# Patient Record
Sex: FEMALE | Race: BLACK OR AFRICAN AMERICAN | Employment: FULL TIME | URBAN - METROPOLITAN AREA
[De-identification: names, ages, dates, MRNs, and addresses within clinical notes are randomized per-mention and may not be internally consistent; named-entity substitution may affect disease eponyms.]

---

## 2019-04-26 ENCOUNTER — OFFICE VISIT - HEALTHEAST (OUTPATIENT)
Dept: FAMILY MEDICINE | Facility: CLINIC | Age: 29
End: 2019-04-26

## 2019-04-26 DIAGNOSIS — N64.4 BREAST PAIN, LEFT: ICD-10-CM

## 2019-04-26 ASSESSMENT — MIFFLIN-ST. JEOR: SCORE: 1559.36

## 2019-04-30 ENCOUNTER — RECORDS - HEALTHEAST (OUTPATIENT)
Dept: ADMINISTRATIVE | Facility: OTHER | Age: 29
End: 2019-04-30

## 2019-11-27 ENCOUNTER — OFFICE VISIT - HEALTHEAST (OUTPATIENT)
Dept: FAMILY MEDICINE | Facility: CLINIC | Age: 29
End: 2019-11-27

## 2019-11-27 DIAGNOSIS — L29.9 ITCHING: ICD-10-CM

## 2019-11-27 LAB
ALBUMIN SERPL-MCNC: 3.7 G/DL (ref 3.5–5)
ALP SERPL-CCNC: 47 U/L (ref 45–120)
ALT SERPL W P-5'-P-CCNC: 12 U/L (ref 0–45)
ANION GAP SERPL CALCULATED.3IONS-SCNC: 5 MMOL/L (ref 5–18)
AST SERPL W P-5'-P-CCNC: 12 U/L (ref 0–40)
BASOPHILS # BLD AUTO: 0 THOU/UL (ref 0–0.2)
BASOPHILS NFR BLD AUTO: 1 % (ref 0–2)
BILIRUB SERPL-MCNC: 0.2 MG/DL (ref 0–1)
BUN SERPL-MCNC: 12 MG/DL (ref 8–22)
CALCIUM SERPL-MCNC: 9.1 MG/DL (ref 8.5–10.5)
CHLORIDE BLD-SCNC: 107 MMOL/L (ref 98–107)
CO2 SERPL-SCNC: 25 MMOL/L (ref 22–31)
CREAT SERPL-MCNC: 0.86 MG/DL (ref 0.6–1.1)
EOSINOPHIL # BLD AUTO: 0.1 THOU/UL (ref 0–0.4)
EOSINOPHIL NFR BLD AUTO: 2 % (ref 0–6)
ERYTHROCYTE [DISTWIDTH] IN BLOOD BY AUTOMATED COUNT: 12.7 % (ref 11–14.5)
GFR SERPL CREATININE-BSD FRML MDRD: >60 ML/MIN/1.73M2
GLUCOSE BLD-MCNC: 89 MG/DL (ref 70–125)
HCT VFR BLD AUTO: 34.9 % (ref 35–47)
HGB BLD-MCNC: 11.5 G/DL (ref 12–16)
LYMPHOCYTES # BLD AUTO: 2.1 THOU/UL (ref 0.8–4.4)
LYMPHOCYTES NFR BLD AUTO: 39 % (ref 20–40)
MCH RBC QN AUTO: 28.5 PG (ref 27–34)
MCHC RBC AUTO-ENTMCNC: 33.1 G/DL (ref 32–36)
MCV RBC AUTO: 86 FL (ref 80–100)
MONOCYTES # BLD AUTO: 0.5 THOU/UL (ref 0–0.9)
MONOCYTES NFR BLD AUTO: 9 % (ref 2–10)
NEUTROPHILS # BLD AUTO: 2.6 THOU/UL (ref 2–7.7)
NEUTROPHILS NFR BLD AUTO: 49 % (ref 50–70)
PLATELET # BLD AUTO: 198 THOU/UL (ref 140–440)
PMV BLD AUTO: 8.6 FL (ref 7–10)
POTASSIUM BLD-SCNC: 4.1 MMOL/L (ref 3.5–5)
PROT SERPL-MCNC: 7.5 G/DL (ref 6–8)
RBC # BLD AUTO: 4.06 MILL/UL (ref 3.8–5.4)
SODIUM SERPL-SCNC: 137 MMOL/L (ref 136–145)
TSH SERPL DL<=0.005 MIU/L-ACNC: 0.92 UIU/ML (ref 0.3–5)
WBC: 5.3 THOU/UL (ref 4–11)

## 2019-11-29 ENCOUNTER — COMMUNICATION - HEALTHEAST (OUTPATIENT)
Dept: INTERNAL MEDICINE | Facility: CLINIC | Age: 29
End: 2019-11-29

## 2019-12-01 ENCOUNTER — COMMUNICATION - HEALTHEAST (OUTPATIENT)
Dept: FAMILY MEDICINE | Facility: CLINIC | Age: 29
End: 2019-12-01

## 2019-12-26 ENCOUNTER — OFFICE VISIT - HEALTHEAST (OUTPATIENT)
Dept: FAMILY MEDICINE | Facility: CLINIC | Age: 29
End: 2019-12-26

## 2019-12-26 DIAGNOSIS — R21 RASH: ICD-10-CM

## 2019-12-26 DIAGNOSIS — Z00.00 ROUTINE GENERAL MEDICAL EXAMINATION AT A HEALTH CARE FACILITY: ICD-10-CM

## 2019-12-26 LAB
ANION GAP SERPL CALCULATED.3IONS-SCNC: 8 MMOL/L (ref 5–18)
BUN SERPL-MCNC: 11 MG/DL (ref 8–22)
CALCIUM SERPL-MCNC: 9.2 MG/DL (ref 8.5–10.5)
CHLORIDE BLD-SCNC: 109 MMOL/L (ref 98–107)
CHOLEST SERPL-MCNC: 171 MG/DL
CO2 SERPL-SCNC: 24 MMOL/L (ref 22–31)
CREAT SERPL-MCNC: 0.75 MG/DL (ref 0.6–1.1)
FASTING STATUS PATIENT QL REPORTED: YES
GFR SERPL CREATININE-BSD FRML MDRD: >60 ML/MIN/1.73M2
GLUCOSE BLD-MCNC: 88 MG/DL (ref 70–125)
HDLC SERPL-MCNC: 46 MG/DL
LDLC SERPL CALC-MCNC: 118 MG/DL
POTASSIUM BLD-SCNC: 4 MMOL/L (ref 3.5–5)
SODIUM SERPL-SCNC: 141 MMOL/L (ref 136–145)
TRIGL SERPL-MCNC: 36 MG/DL

## 2019-12-26 ASSESSMENT — MIFFLIN-ST. JEOR: SCORE: 1558.68

## 2019-12-27 ENCOUNTER — COMMUNICATION - HEALTHEAST (OUTPATIENT)
Dept: FAMILY MEDICINE | Facility: CLINIC | Age: 29
End: 2019-12-27

## 2019-12-27 LAB
BKR LAB AP ABNORMAL BLEEDING: NO
BKR LAB AP BIRTH CONTROL/HORMONES: NORMAL
BKR LAB AP CERVICAL APPEARANCE: NORMAL
BKR LAB AP GYN ADEQUACY: NORMAL
BKR LAB AP GYN INTERPRETATION: NORMAL
BKR LAB AP HPV REFLEX: NORMAL
BKR LAB AP LMP: NORMAL
BKR LAB AP PATIENT STATUS: NORMAL
BKR LAB AP PREVIOUS ABNORMAL: NORMAL
BKR LAB AP PREVIOUS NORMAL: NORMAL
C TRACH DNA SPEC QL PROBE+SIG AMP: NEGATIVE
HIGH RISK?: NO
HSV1 IGG SERPL QL IA: POSITIVE
HSV2 IGG SERPL QL IA: POSITIVE
PATH REPORT.COMMENTS IMP SPEC: NORMAL
RESULT FLAG (HE HISTORICAL CONVERSION): NORMAL

## 2019-12-30 ENCOUNTER — COMMUNICATION - HEALTHEAST (OUTPATIENT)
Dept: SCHEDULING | Facility: CLINIC | Age: 29
End: 2019-12-30

## 2019-12-30 ENCOUNTER — COMMUNICATION - HEALTHEAST (OUTPATIENT)
Dept: FAMILY MEDICINE | Facility: CLINIC | Age: 29
End: 2019-12-30

## 2020-01-13 ENCOUNTER — OFFICE VISIT - HEALTHEAST (OUTPATIENT)
Dept: FAMILY MEDICINE | Facility: CLINIC | Age: 30
End: 2020-01-13

## 2020-01-13 DIAGNOSIS — R07.89 LEFT-SIDED CHEST WALL PAIN: ICD-10-CM

## 2020-01-13 DIAGNOSIS — B00.9 HSV (HERPES SIMPLEX VIRUS) INFECTION: ICD-10-CM

## 2020-01-13 DIAGNOSIS — M79.661 PAIN OF RIGHT LOWER LEG: ICD-10-CM

## 2020-01-16 ENCOUNTER — COMMUNICATION - HEALTHEAST (OUTPATIENT)
Dept: FAMILY MEDICINE | Facility: CLINIC | Age: 30
End: 2020-01-16

## 2020-02-05 ENCOUNTER — COMMUNICATION - HEALTHEAST (OUTPATIENT)
Dept: FAMILY MEDICINE | Facility: CLINIC | Age: 30
End: 2020-02-05

## 2021-03-29 ENCOUNTER — OFFICE VISIT - HEALTHEAST (OUTPATIENT)
Dept: FAMILY MEDICINE | Facility: CLINIC | Age: 31
End: 2021-03-29

## 2021-03-29 DIAGNOSIS — R09.A2 GLOBUS PHARYNGEUS: ICD-10-CM

## 2021-03-29 DIAGNOSIS — B00.9 HSV (HERPES SIMPLEX VIRUS) INFECTION: ICD-10-CM

## 2021-03-29 DIAGNOSIS — Z30.09 FAMILY PLANNING: ICD-10-CM

## 2021-03-29 RX ORDER — ERGOCALCIFEROL 1.25 MG/1
50000 CAPSULE ORAL
Status: SHIPPED | COMMUNITY
Start: 2021-03-29 | End: 2023-02-16

## 2021-03-29 RX ORDER — FERROUS GLUCONATE 324(38)MG
324 TABLET ORAL
Status: SHIPPED | COMMUNITY
Start: 2021-03-29 | End: 2022-11-08

## 2021-03-29 RX ORDER — PRENATAL VIT/IRON FUM/FOLIC AC 27MG-0.8MG
1 TABLET ORAL DAILY
Qty: 90 TABLET | Refills: 3 | Status: SHIPPED | OUTPATIENT
Start: 2021-03-29 | End: 2022-05-26

## 2021-03-29 RX ORDER — OMEPRAZOLE 40 MG/1
40 CAPSULE, DELAYED RELEASE ORAL DAILY
Qty: 90 CAPSULE | Refills: 0 | Status: SHIPPED | OUTPATIENT
Start: 2021-03-29 | End: 2022-11-08

## 2021-05-28 NOTE — PROGRESS NOTES
"Assessment/Plan:        1. Breast pain, left  After verbal consent to examining her breast, she was dressed into a gown, and both breasts were examined with no obvious abnormalities.     Plan:     - US Breast Limited (Focal) Left; Future  To rule out structural abnormalities   We will follow-up to the results of the study and manage accordingly.      Patient advised of trying   - Ibuprofen up to 800 mg three times a day prn   - Evening Primrose Oil 0657-3646 mg orally daily  - Chaste Tree Fowler 30 to 40 mg orally daily    30 minutes or greater were spent with the patient today with greater than 50% of the times spent in counseling and management of care.       Subjective:    Patient ID:   Muriel Chauhan is a 28 y.o. female presenting with left breast pain for the past 2 months, starting up with just an intermittent sharp pain but for the past week has become more frequent.  She has no pain at this time no lumps or bumps or discharge of being seen.  She has no prior breast problems there is no family history of breast disease or cancer.      Review of Systems  Allergy: reviewed  General : negative  A complete 5 point review of systems was obtained and is negative other than what is stated in the HPI.      The following patient's history were reviewed and updated as appropriate:   She  has no past medical history on file.  Her family history is not on file.  She  reports that she has never smoked. She has never used smokeless tobacco. Her alcohol and drug histories are not on file..      No outpatient encounter medications on file as of 4/26/2019.     No facility-administered encounter medications on file as of 4/26/2019.          Objective:   /72 (Patient Site: Right Arm, Patient Position: Sitting, Cuff Size: Adult Regular)   Pulse 77   Ht 5' 3.5\" (1.613 m)   Wt 189 lb (85.7 kg)   SpO2 95%   BMI 32.95 kg/m         Physical Exam  General: NAD and well hydrated   Breasts: breasts appear normal, no " suspicious masses, no skin or nipple changes or axillary nodes.     Zulay Boyer CMA present during the exam

## 2021-05-28 NOTE — PATIENT INSTRUCTIONS - HE
1. Breast pain, left  - US Breast Limited (Focal) Left; Future    You may also try   - Ibuprofen up to 800 mg three times a day prn   - Evening Primrose Oil 1402-7254 mg orally daily  - Chaste Tree Fowler 30 to 40 mg orally daily  We will follow-up to the results of the study and manage accordingly.

## 2021-06-03 VITALS — BODY MASS INDEX: 32.27 KG/M2 | HEIGHT: 64 IN | WEIGHT: 189 LBS

## 2021-06-03 NOTE — PROGRESS NOTES
Assessment/Plan:        1. Itching  Exam findings were discussed   Consider dry skin as the culprit.     Will check the following lab :   - Comprehensive Metabolic Panel  - HM1(CBC and Differential)  - Thyroid Stimulating Hormone (TSH)  - HM1 (CBC with Diff)    Plan:   - predniSONE (DELTASONE) 20 MG tablet; Take 20 mg by mouth daily for 7 days.  Dispense: 7 tablet; Refill: 0     Close follow up if no significant change or improvement as anticipated.       At the conclusion of the encounter the plan of care, disposition and all questions were answered and reviewed, and the patient acknowledged understanding and was involved in the decision making regarding the overall care plan.           Subjective:    Patient ID:   Tien Sanchez is a 28 y.o. female presenting with having recurrent itchy rash noted around her waist up for the past month. She states not taking hot showers in general, but has had a brief change of soap recently and has since changed back to original.     She denies any other life style changes and the OTC eucerin cream hasn't all that been effective.     She Denies taking supplements, or other prescribed meds.   Diet: no change.    Review of Systems  Allergy: reviewed  General : negative  A complete 5 point review of systems was obtained and is negative other than what is stated in the HPI.      The following patient's history were reviewed and updated as appropriate:   She  has no past medical history on file..      No outpatient encounter medications on file as of 11/27/2019.     No facility-administered encounter medications on file as of 11/27/2019.          Objective:   /64 (Patient Site: Right Arm, Patient Position: Sitting, Cuff Size: Adult Large)   Pulse 60   Wt 191 lb (86.6 kg)   SpO2 98%   BMI 33.30 kg/m        Physical Exam  Gen: NAD, well hydrated  Skin: dry skin , no other vesicles, exfoliations, or petechiae.    [place]

## 2021-06-04 VITALS
WEIGHT: 187.1 LBS | BODY MASS INDEX: 31.94 KG/M2 | DIASTOLIC BLOOD PRESSURE: 68 MMHG | HEART RATE: 82 BPM | OXYGEN SATURATION: 99 % | SYSTOLIC BLOOD PRESSURE: 114 MMHG | HEIGHT: 64 IN

## 2021-06-04 VITALS
WEIGHT: 191 LBS | BODY MASS INDEX: 33.3 KG/M2 | HEART RATE: 60 BPM | DIASTOLIC BLOOD PRESSURE: 64 MMHG | SYSTOLIC BLOOD PRESSURE: 114 MMHG | OXYGEN SATURATION: 98 %

## 2021-06-04 VITALS
OXYGEN SATURATION: 98 % | WEIGHT: 188.8 LBS | SYSTOLIC BLOOD PRESSURE: 102 MMHG | DIASTOLIC BLOOD PRESSURE: 60 MMHG | HEART RATE: 65 BPM | BODY MASS INDEX: 32.41 KG/M2

## 2021-06-04 NOTE — PROGRESS NOTES
FEMALE PREVENTATIVE EXAM    Assessment and Plan:       1. Routine general medical examination at a health care facility  - Basic Metabolic Panel  - Lipid Profile  - Gynecologic Cytology (PAP Smear)  - Chlamydia trachomatis, AMP Probe    2. Rash  Noted intermittently in the genitals and medial thighs.   Normal exam today     Plan:   - Herpes simplex Virus (Type 1 and 2) IgG Antibody     Next follow up:  Return in about 1 year (around 12/26/2020) for or sooner with any issues or concerns.    Immunization Review  Adult Imm Review: No immunizations due today    I discussed the following with the patient:   Adult Healthy Living: Importance of regular exercise  Healthy nutrition        Subjective:   Chief Complaint: Muriel Chauhan is an 29 y.o. female here for a preventative health visit.     Other concerns:   Noting to breaking out with a burning / blistering rash in her genitals and upper medial thighs intermittently.  She currently has no rash.     Healthy Habits  Are you taking a daily aspirin? No  Do you typically exercising at least 40 min, 3-4 times per week?  NO  Do you usually eat at least 4 servings of fruit and vegetables a day, include whole grains and fiber and avoid regularly eating high fat foods? Yes  Have you had an eye exam in the past two years? NO  Do you see a dentist twice per year? NO  Do you have any concerns regarding sleep? YES    Safety Screen  If you own firearms, are they secured in a locked gun cabinet or with trigger locks? The patient does not own any firearms  Do you feel you are safe where you are living?: Yes (12/8/2019 12:45 AM)  Do you feel you are safe in your relationship(s)?: Yes (12/8/2019 12:45 AM)      Review of Systems:  Please see above.  The rest of the review of systems are negative for all systems.     Pap History:   Last 3 PAP results:  No results found for: PAP  Cancer Screening       Status Date      PAP SMEAR Next Due 12/26/2022      Done 12/26/2019 GYNECOLOGIC  "CYTOLOGY (PAP SMEAR)          Patient Care Team:  Provider, No Primary Care as PCP - General  Hima Mcdaniels MD as Assigned PCP        History     Reviewed By Date/Time Sections Reviewed    Hima Mcdaniels MD 12/26/2019 11:22 AM Tobacco, Alcohol, Drug Use, Sexual Activity, Family    Hima Mcdaniels MD 12/26/2019 11:21 AM Medical    Remi Ngo, [facility] 12/26/2019 11:00 AM Tobacco    Remi Ngo, [facility] 12/26/2019 10:58 AM Tobacco            Objective:   Vital Signs:   Visit Vitals  /68   Pulse 82   Ht 5' 4\" (1.626 m)   Wt 187 lb 1.6 oz (84.9 kg)   LMP 12/26/2019   SpO2 99%   BMI 32.12 kg/m           PHYSICAL EXAM  General : appears stated age, no distress and well hydrated   Head: Normocephalic, without obvious abnormality, atraumatic  Eyes: conjunctivae/corneas clear. PERRL, EOM's intact. Fundi benign.  ENT: normal TM and canals bilaterally, no rhinorrhea, nl pharynx, palate, tongue, oral mucosa and gums.   Neck: no adenopathy, no carotid bruit, no JVD, supple, symmetrical, trachea midline and thyroid not enlarged, symmetric, no tenderness/mass/nodules  Back: symmetric, no curvature. ROM normal. No CVA tenderness.  Lungs: clear to auscultation bilaterally.   Heart: regular rate and rhythm, S1, S2 normal, no murmur, click, rub or gallop  Abdomen: soft, non-tender; bowel sounds normal; no masses,  no organomegaly  Pelvic: cervix normal in appearance, external genitalia normal, no adnexal masses or tenderness, no cervical motion tenderness, rectovaginal septum normal, uterus normal size, shape, and consistency and vagina normal without discharge  Extremities: extremities normal, atraumatic, no cyanosis or edema  Pulses: 2+ and symmetric  Skin: Skin color, texture, turgor normal. No rashes or lesions  Neurologic: Grossly intact,  normal strength and tone. Normal symmetric reflexes. Normal coordination and gait  Psych: Alert and oriented X3, normal mood and affect.              Additional " Screenings Completed Today:

## 2021-06-04 NOTE — TELEPHONE ENCOUNTER
RN Telephone note for CMT line:      Pt would like to speak with PCP or covering MD regarding test results; positive results for some and low values for others.    Has questions.    Positive for HSV 1 and HSV 2    Rash on thigh present.    Pt doesn't have Rx for antiviral medication.    PLAN:  Will forward to covering MD per patient request.    Giuliana Bermeo RN   [facility] RN Triage

## 2021-06-04 NOTE — TELEPHONE ENCOUNTER
----- Message from Hima Mcdaniels MD sent at 12/27/2019  2:57 PM CST -----  Please call patient :   Positive with  Both HSV-1 and HSV-2 are implicated in genital and orofacial primary infections.   The rash experienced is likely caused by the HSV infections    Managed with antiviral medication per out breaks, or daily prophylaxis for frequent outbreaks.

## 2021-06-05 VITALS
TEMPERATURE: 98.1 F | WEIGHT: 201 LBS | HEART RATE: 86 BPM | DIASTOLIC BLOOD PRESSURE: 76 MMHG | OXYGEN SATURATION: 97 % | SYSTOLIC BLOOD PRESSURE: 124 MMHG | BODY MASS INDEX: 34.5 KG/M2

## 2021-06-05 NOTE — TELEPHONE ENCOUNTER
Pt has [facility] visit 2/6/20 for immunization.  Pt last seen 1/13/20    First Hepatitis B immunization given 10/11/19.    Please review order and sign.

## 2021-06-05 NOTE — PROGRESS NOTES
Assessment/Plan:        1. HSV (herpes simplex virus) infection  Pathophysiology, mode of transmission reviewed with the patient  Treatment options reviewed.  She plans to take antiviral as needed per  Breakouts  - valACYclovir (VALTREX) 500 MG tablet; Take 1 tablet (500 mg total) by mouth 2 (two) times a day for 3 days.  Dispense: 6 tablet; Refill: 5    2. Left-sided chest wall pain  Exam findings were discussed and consider musculoskeletal  Supportive care with NSAIDs  Close follow up if no significant change or improvement as anticipated.      3. Pain of right lower leg  Exam findings were discussed  - US Leg Non Vascular Right;   We will follow-up to the results of the study and manage accordingly.        At the conclusion of the encounter the plan of care, disposition and all questions were answered and reviewed, and the patient acknowledged understanding and was involved in the decision making regarding the overall care plan.     Patient Care Team:  Provider, No Primary Care as PCP - Hima Eli MD as Assigned PCP      30 minutes or greater were spent with the patient today with greater than 50% of the times spent in counseling and management of care.     Subjective:    Patient ID:   Muriel Chauhan is a 29 y.o. female comes in follow-up of HSV and having the following concerns:    1. HSV (herpes simplex virus) infection  She is inquiring about treatment options and would like to know more about how it is being infectious and transmitted.    2. Left-sided chest wall pain  She has been having intermittent pain in the left side of the chest radiating into her shoulder and arm worse with movement and moving her arm.  She believes that it is musculoskeletal but wanting to check.  She had a neg/ normal Breast US back in April of 2019.        3. Pain of right lower leg  She has been experiencing pain in her right lower extremity for the past several days with no known injury or trauma recalled.             Review of Systems  Allergy: reviewed  General : negative  A complete 5 point review of systems was obtained and is negative other than what is stated in the HPI.     The following patient's history were reviewed and updated as appropriate:   She  has no past medical history on file..      No outpatient encounter medications on file as of 1/13/2020.     No facility-administered encounter medications on file as of 1/13/2020.          Objective:   /60 (Patient Site: Right Arm, Patient Position: Sitting, Cuff Size: Adult Regular)   Pulse 65   Wt 188 lb 12.8 oz (85.6 kg)   LMP 12/26/2019   SpO2 98%   BMI 32.41 kg/m        Physical Exam  General exam: No apparent distress and well-hydrated  Chest and CV exam: Palpable tenderness to the left chest wall, clear to auscultation bilaterally with no overlying chest bruising or deformity, regular rate rhythm S1-S2 no murmur  Skin: No rash  Extremities: Right lower leg exam normal to inspection, palpable tenderness to the lateral medial aspect of the lower leg with no swelling

## 2021-06-05 NOTE — TELEPHONE ENCOUNTER
----- Message from Hima Mcdaniels MD sent at 1/15/2020  4:01 PM CST -----US results   Please call patient :   Radiograph ( xray ) is recommended for further eval

## 2021-06-16 NOTE — PROGRESS NOTES
Assessment & Plan     Globus pharyngeus  Exam findings were discussed  Plan:  - omeprazole (PRILOSEC) 40 MG capsule; Take 1 capsule (40 mg total) by mouth daily.    Consider referral to ENT if not better as anticipated    Family planning  - prenatal vit no.130-iron-folic (PRENATAL VITAMIN) 27 mg iron- 800 mcg Tab tablet; Take 1 tablet by mouth daily.    HSV (herpes simplex virus) infection  Refill  - valACYclovir (VALTREX) 500 MG tablet; Take 1 tablet (500 mg total) by mouth 2 (two) times a day for 3 days.    30 minutes spent on the date of the encounter doing chart review, patient visit and documentation        No follow-ups on file.    Hima Mcdaniels MD  [facility]    Rosita Chauhan is 30 y.o. and presents today for the following health issues   Sensation of sorethroat or mucus building up in the back of the throat on and off for the past year, seem to worsen when eating or drinking anything cold, after spicy foods.   She has a history of follow-up GERD but has not been taking any medicine for the past.  She also needs her Valtrex refilled.             Objective    /76 (Patient Site: Right Arm, Patient Position: Sitting, Cuff Size: Adult Regular)   Pulse 86   Temp 98.1  F (36.7  C) (Oral)   Wt 201 lb (91.2 kg)   SpO2 97%   BMI 34.50 kg/m    Body mass index is 34.5 kg/m .  Physical Exam  General Appearance:    Alert, cooperative, no distress   Throat:   mucous membranes moist, pharynx normal without lesions and tongue normal   Neck:   Supple, symmetrical, trachea midline, no adenopathy;     thyroid:  no enlargement/tenderness/nodules;    Lungs:     clear to auscultation, no wheezes, rales or rhonchi, symmetric air entry     Heart:    Regular rate and rhythm, S1 and S2 normal, no murmur, rub   or gallop   Skin:    no rashes or lesions

## 2021-06-19 NOTE — LETTER
Letter by Hima Mcdaniels MD at      Author: Hima Mcdaniels MD Service: -- Author Type: --    Filed:  Encounter Date: 11/29/2019 Status: Signed         Muriel Chauhan  [address] [place] [address]             November 29, 2019         Dear Ms. Chauhan,    Below are the results from your recent visit:    Resulted Orders   Comprehensive Metabolic Panel   Result Value Ref Range    Sodium 137 136 - 145 mmol/L    Potassium 4.1 3.5 - 5.0 mmol/L    Chloride 107 98 - 107 mmol/L    CO2 25 22 - 31 mmol/L    Anion Gap, Calculation 5 5 - 18 mmol/L    Glucose 89 70 - 125 mg/dL    BUN 12 8 - 22 mg/dL    Creatinine 0.86 0.60 - 1.10 mg/dL    GFR MDRD Af Amer >60 >60 mL/min/1.73m2    GFR MDRD Non Af Amer >60 >60 mL/min/1.73m2    Bilirubin, Total 0.2 0.0 - 1.0 mg/dL    Calcium 9.1 8.5 - 10.5 mg/dL    Protein, Total 7.5 6.0 - 8.0 g/dL    Albumin 3.7 3.5 - 5.0 g/dL    Alkaline Phosphatase 47 45 - 120 U/L    AST 12 0 - 40 U/L    ALT 12 0 - 45 U/L    Narrative    Fasting Glucose reference range is 70-99 mg/dL per  American Diabetes Association (ADA) guidelines.   Thyroid Stimulating Hormone (TSH)   Result Value Ref Range    TSH 0.92 0.30 - 5.00 uIU/mL   HM1 (CBC with Diff)   Result Value Ref Range    WBC 5.3 4.0 - 11.0 thou/uL    RBC 4.06 3.80 - 5.40 mill/uL    Hemoglobin 11.5 (L) 12.0 - 16.0 g/dL    Hematocrit 34.9 (L) 35.0 - 47.0 %    MCV 86 80 - 100 fL    MCH 28.5 27.0 - 34.0 pg    MCHC 33.1 32.0 - 36.0 g/dL    RDW 12.7 11.0 - 14.5 %    Platelets 198 140 - 440 thou/uL    MPV 8.6 7.0 - 10.0 fL    Neutrophils % 49 (L) 50 - 70 %    Lymphocytes % 39 20 - 40 %    Monocytes % 9 2 - 10 %    Eosinophils % 2 0 - 6 %    Basophils % 1 0 - 2 %    Neutrophils Absolute 2.6 2.0 - 7.7 thou/uL    Lymphocytes Absolute 2.1 0.8 - 4.4 thou/uL    Monocytes Absolute 0.5 0.0 - 0.9 thou/uL    Eosinophils Absolute 0.1 0.0 - 0.4 thou/uL    Basophils Absolute 0.0 0.0 - 0.2 thou/uL              Low hgb, otherwise normal labs      Please  call with questions or contact us using [employer].    Sincerely,        Electronically signed by Hima Mcdaniels MD

## 2021-06-20 ENCOUNTER — HEALTH MAINTENANCE LETTER (OUTPATIENT)
Age: 31
End: 2021-06-20

## 2021-06-20 NOTE — LETTER
Letter by Hima Mcdaniels MD at      Author: Hima Mcdaniels MD Service: -- Author Type: --    Filed:  Encounter Date: 12/27/2019 Status: Signed         Muriel Chauhan  [address] [place] [address]             December 27, 2019         Dear Ms. Chauhan,    Below are the results from your recent visit:    Resulted Orders   Basic Metabolic Panel   Result Value Ref Range    Sodium 141 136 - 145 mmol/L    Potassium 4.0 3.5 - 5.0 mmol/L    Chloride 109 (H) 98 - 107 mmol/L    CO2 24 22 - 31 mmol/L    Anion Gap, Calculation 8 5 - 18 mmol/L    Glucose 88 70 - 125 mg/dL    Calcium 9.2 8.5 - 10.5 mg/dL    BUN 11 8 - 22 mg/dL    Creatinine 0.75 0.60 - 1.10 mg/dL    GFR MDRD Af Amer >60 >60 mL/min/1.73m2    GFR MDRD Non Af Amer >60 >60 mL/min/1.73m2    Narrative    Fasting Glucose reference range is 70-99 mg/dL per  American Diabetes Association (ADA) guidelines.   Lipid Profile   Result Value Ref Range    Triglycerides 36 <=149 mg/dL    Cholesterol 171 <=199 mg/dL    LDL Calculated 118 <=129 mg/dL    HDL Cholesterol 46 (L) >=50 mg/dL    Patient Fasting > 8hrs? Yes    Herpes simplex Virus (Type 1 and 2) IgG Antibody   Result Value Ref Range    Herpes simplex Virus (Type 1) IgG Antibody Positive (!) Negative    Herpes simplex Virus (Type 2) IgG Antibody Positive (!) Negative   Chlamydia trachomatis, AMP Probe   Result Value Ref Range    Chlamydia trachomatis, Amplified Detection Negative Negative        Negative     Please call with questions or contact us using [employer].    Sincerely,        Electronically signed by Hima Mcdaniels MD

## 2021-06-20 NOTE — LETTER
Letter by Hima Mcdaniels MD at      Author: Hima Mcdaniels MD Service: -- Author Type: --    Filed:  Encounter Date: 12/27/2019 Status: Signed         Muriel Chauhan  [address] [place] [address]             December 27, 2019         Dear Ms. Chauhan,    Below are the results from your recent visit:    Resulted Orders   Basic Metabolic Panel   Result Value Ref Range    Sodium 141 136 - 145 mmol/L    Potassium 4.0 3.5 - 5.0 mmol/L    Chloride 109 (H) 98 - 107 mmol/L    CO2 24 22 - 31 mmol/L    Anion Gap, Calculation 8 5 - 18 mmol/L    Glucose 88 70 - 125 mg/dL    Calcium 9.2 8.5 - 10.5 mg/dL    BUN 11 8 - 22 mg/dL    Creatinine 0.75 0.60 - 1.10 mg/dL    GFR MDRD Af Amer >60 >60 mL/min/1.73m2    GFR MDRD Non Af Amer >60 >60 mL/min/1.73m2    Narrative    Fasting Glucose reference range is 70-99 mg/dL per  American Diabetes Association (ADA) guidelines.   Lipid Profile   Result Value Ref Range    Triglycerides 36 <=149 mg/dL    Cholesterol 171 <=199 mg/dL    LDL Calculated 118 <=129 mg/dL    HDL Cholesterol 46 (L) >=50 mg/dL    Patient Fasting > 8hrs? Yes    Herpes simplex Virus (Type 1 and 2) IgG Antibody   Result Value Ref Range    Herpes simplex Virus (Type 1) IgG Antibody Positive (!) Negative    Herpes simplex Virus (Type 2) IgG Antibody Positive (!) Negative   Chlamydia trachomatis, AMP Probe   Result Value Ref Range    Chlamydia trachomatis, Amplified Detection Negative Negative       Positive with  Both HSV-1 and HSV-2 are implicated in genital and orofacial primary infections.   The rash experienced is likely caused by the HSV infections     Managed with antiviral medication per out breaks, or daily prophylaxis for frequent outbreaks.       Please call with questions or contact us using [employer].    Sincerely,        Electronically signed by Hima Mcdaniels MD

## 2021-10-11 ENCOUNTER — HEALTH MAINTENANCE LETTER (OUTPATIENT)
Age: 31
End: 2021-10-11

## 2022-05-22 DIAGNOSIS — Z30.09 FAMILY PLANNING: ICD-10-CM

## 2022-05-26 RX ORDER — PRENATAL VIT/IRON FUM/FOLIC AC 27MG-0.8MG
1 TABLET ORAL DAILY
Qty: 90 TABLET | Refills: 3 | Status: SHIPPED | OUTPATIENT
Start: 2022-05-26

## 2022-05-26 NOTE — TELEPHONE ENCOUNTER
Routing refill request to provider for review/approval because:  Drug not on the [facility] refill protocol   Patient needs to be seen because it has been more than 1 year since last office visit.  Break in medication.    Last Written Prescription Date:  3/29/2021  Last Fill Quantity: 90,  # refills: 3   Last office visit provider:   3/29/2021 Dr. Mcdaniels     prenatal vit no.130-iron-folic (PRENATAL VITAMIN) 27 mg iron- 800 mcg Tab tablet 90 tablet 3 3/29/2021  --   Sig - Route: Take 1 tablet by mouth daily. - Oral   Sent to pharmacy as: prenatal vits no.130-ferrous fum 27 mg iron-folic acid 800 mcg tablet (PRENATAL VITAMIN)   E-Prescribing Status: Receipt confirmed by pharmacy (3/29/2021 12:05 PM CDT)         Requested Prescriptions   Pending Prescriptions Disp Refills     Prenatal Vit-Fe Fumarate-FA (PRENATAL MULTIVITAMIN W/IRON) 27-0.8 MG tablet 90 tablet 3     Sig: Take 1 tablet by mouth daily       There is no refill protocol information for this order          Alexandria Forbes RN 05/26/22 1:51 PM

## 2022-07-17 ENCOUNTER — HEALTH MAINTENANCE LETTER (OUTPATIENT)
Age: 32
End: 2022-07-17

## 2022-09-25 ENCOUNTER — HEALTH MAINTENANCE LETTER (OUTPATIENT)
Age: 32
End: 2022-09-25

## 2022-11-08 ENCOUNTER — OFFICE VISIT (OUTPATIENT)
Dept: FAMILY MEDICINE | Facility: CLINIC | Age: 32
End: 2022-11-08
Payer: COMMERCIAL

## 2022-11-08 VITALS
TEMPERATURE: 97.5 F | DIASTOLIC BLOOD PRESSURE: 70 MMHG | HEART RATE: 78 BPM | WEIGHT: 188.2 LBS | OXYGEN SATURATION: 98 % | SYSTOLIC BLOOD PRESSURE: 108 MMHG | RESPIRATION RATE: 20 BRPM | BODY MASS INDEX: 32.13 KG/M2 | HEIGHT: 64 IN

## 2022-11-08 DIAGNOSIS — B00.9 HSV (HERPES SIMPLEX VIRUS) INFECTION: ICD-10-CM

## 2022-11-08 DIAGNOSIS — Z12.4 CERVICAL CANCER SCREENING: ICD-10-CM

## 2022-11-08 DIAGNOSIS — Z00.00 ROUTINE GENERAL MEDICAL EXAMINATION AT A HEALTH CARE FACILITY: Primary | ICD-10-CM

## 2022-11-08 DIAGNOSIS — Z11.59 NEED FOR HEPATITIS C SCREENING TEST: ICD-10-CM

## 2022-11-08 DIAGNOSIS — N92.1 MENORRHAGIA WITH IRREGULAR CYCLE: ICD-10-CM

## 2022-11-08 LAB
CHOLEST SERPL-MCNC: 193 MG/DL
FASTING STATUS PATIENT QL REPORTED: YES
FASTING STATUS PATIENT QL REPORTED: YES
GLUCOSE BLD-MCNC: 99 MG/DL (ref 70–99)
HCV AB SERPL QL IA: NONREACTIVE
HDLC SERPL-MCNC: 54 MG/DL
LDLC SERPL CALC-MCNC: 126 MG/DL
NONHDLC SERPL-MCNC: 139 MG/DL
TRIGL SERPL-MCNC: 63 MG/DL

## 2022-11-08 PROCEDURE — 36415 COLL VENOUS BLD VENIPUNCTURE: CPT | Performed by: PHYSICIAN ASSISTANT

## 2022-11-08 PROCEDURE — 99213 OFFICE O/P EST LOW 20 MIN: CPT | Mod: 25 | Performed by: PHYSICIAN ASSISTANT

## 2022-11-08 PROCEDURE — 80061 LIPID PANEL: CPT | Performed by: PHYSICIAN ASSISTANT

## 2022-11-08 PROCEDURE — 87624 HPV HI-RISK TYP POOLED RSLT: CPT | Performed by: PHYSICIAN ASSISTANT

## 2022-11-08 PROCEDURE — 86803 HEPATITIS C AB TEST: CPT | Performed by: PHYSICIAN ASSISTANT

## 2022-11-08 PROCEDURE — 99395 PREV VISIT EST AGE 18-39: CPT | Performed by: PHYSICIAN ASSISTANT

## 2022-11-08 PROCEDURE — G0145 SCR C/V CYTO,THINLAYER,RESCR: HCPCS | Performed by: PHYSICIAN ASSISTANT

## 2022-11-08 PROCEDURE — 82947 ASSAY GLUCOSE BLOOD QUANT: CPT | Performed by: PHYSICIAN ASSISTANT

## 2022-11-08 RX ORDER — VALACYCLOVIR HYDROCHLORIDE 500 MG/1
500 TABLET, FILM COATED ORAL 2 TIMES DAILY
Qty: 12 TABLET | Refills: 2 | Status: ON HOLD | OUTPATIENT
Start: 2022-11-08 | End: 2023-04-13

## 2022-11-08 ASSESSMENT — ENCOUNTER SYMPTOMS
FEVER: 0
NAUSEA: 0
HEARTBURN: 0
MYALGIAS: 0
SORE THROAT: 0
WEAKNESS: 0
COUGH: 0
DIZZINESS: 0
NERVOUS/ANXIOUS: 0
HEMATOCHEZIA: 0
EYE PAIN: 0
BREAST MASS: 0
ABDOMINAL PAIN: 1
CHILLS: 0
DYSURIA: 0
CONSTIPATION: 0
JOINT SWELLING: 0
PARESTHESIAS: 0
HEADACHES: 0
FREQUENCY: 0
HEMATURIA: 0
SHORTNESS OF BREATH: 0
PALPITATIONS: 0
ARTHRALGIAS: 0
DIARRHEA: 0

## 2022-11-08 ASSESSMENT — PAIN SCALES - GENERAL: PAINLEVEL: NO PAIN (0)

## 2022-11-08 NOTE — PROGRESS NOTES
SUBJECTIVE:   CC: Muriel is an 31 year old who presents for preventive health visit.       Patient has been advised of split billing requirements and indicates understanding: Yes  Healthy Habits:     Getting at least 3 servings of Calcium per day:  NO    Bi-annual eye exam:  NO    Dental care twice a year:  NO    Sleep apnea or symptoms of sleep apnea:  None    Diet:  Regular (no restrictions)    Frequency of exercise:  1 day/week    Duration of exercise:  30-45 minutes    Taking medications regularly:  Yes    Medication side effects:  None    PHQ-2 Total Score: 0    Additional concerns today:  Yes      PROBLEMS TO ADD ON...  -------------------------------------  Discuss changes in menstrual cycles  Some periods last longer than others, 7 days is the longest  Average 3-4 days  Cycles can be anywhere from 26-33 days, irregularity not new  Has had 2 periods per month three times in the last couple years   Wondering if irregularities can affect fertility, conception     Would like prescription for acyclovir, has been 1+ years since last taken - patient lost insurance and hasn't been able to get it since then     Today's PHQ-2 Score:   PHQ-2 ( 1999 Pfizer) 11/8/2022   Q1: Little interest or pleasure in doing things 0   Q2: Feeling down, depressed or hopeless 0   PHQ-2 Score 0   Q1: Little interest or pleasure in doing things Not at all   Q2: Feeling down, depressed or hopeless Not at all   PHQ-2 Score 0       Abuse: Current or Past (Physical, Sexual or Emotional) - No  Do you feel safe in your environment? Yes    Have you ever done Advance Care Planning? (For example, a Health Directive, POLST, or a discussion with a medical provider or your loved ones about your wishes): No, advance care planning information given to patient to review.  Advanced care planning was discussed at today's visit.    Social History     Tobacco Use     Smoking status: Never     Smokeless tobacco: Never   Substance Use Topics     Alcohol  use: Yes     Comment: social         Alcohol Use 11/8/2022   Prescreen: >3 drinks/day or >7 drinks/week? No       Reviewed orders with patient.  Reviewed health maintenance and updated orders accordingly - Yes  Lab work is in process  Labs reviewed in EPIC    Breast Cancer Screening:  Any new diagnosis of family breast, ovarian, or bowel cancer? No    FHS-7: No flowsheet data found.    Patient under 40 years of age: Routine Mammogram Screening not recommended.   Pertinent mammograms are reviewed under the imaging tab.    History of abnormal Pap smear: NO - age 30-65 PAP every 5 years with negative HPV co-testing recommended  PAP / HPV Latest Ref Rng & Units 12/26/2019   PAP Negative for squamous intraepithelial lesion or malignancy. Negative for squamous intraepithelial lesion or malignancy  Electronically signed by Poppy Fleming CT (ASCP) on 12/27/2019 at  3:28 PM       Reviewed and updated as needed this visit by clinical staff   Tobacco  Allergies  Meds   Med Hx  Surg Hx  Fam Hx  Soc Hx        Reviewed and updated as needed this visit by Provider                 History reviewed. No pertinent past medical history.     Review of Systems   Constitutional: Negative for chills and fever.   HENT: Negative for congestion, ear pain, hearing loss and sore throat.    Eyes: Negative for pain and visual disturbance.   Respiratory: Negative for cough and shortness of breath.    Cardiovascular: Negative for chest pain, palpitations and peripheral edema.   Gastrointestinal: Positive for abdominal pain. Negative for constipation, diarrhea, heartburn, hematochezia and nausea.   Breasts:  Negative for tenderness, breast mass and discharge.   Genitourinary: Negative for dysuria, frequency, genital sores, hematuria, pelvic pain, urgency, vaginal bleeding and vaginal discharge.   Musculoskeletal: Negative for arthralgias, joint swelling and myalgias.   Skin: Negative for rash.   Neurological: Negative for dizziness,  "weakness, headaches and paresthesias.   Psychiatric/Behavioral: Negative for mood changes. The patient is not nervous/anxious.         OBJECTIVE:   /70 (BP Location: Right arm, Patient Position: Sitting, Cuff Size: Adult Regular)   Pulse 78   Temp 97.5  F (36.4  C) (Tympanic)   Resp 20   Ht 1.63 m (5' 4.17\")   Wt 85.4 kg (188 lb 3.2 oz)   LMP 10/30/2022 (Exact Date)   SpO2 98%   Breastfeeding No   BMI 32.13 kg/m    Physical Exam  GENERAL: healthy, alert and no distress  EYES: Eyes grossly normal to inspection, PERRL and conjunctivae and sclerae normal  HENT: ear canals and TM's normal, nose and mouth without ulcers or lesions  NECK: no adenopathy, no asymmetry, masses, or scars and thyroid normal to palpation  RESP: lungs clear to auscultation - no rales, rhonchi or wheezes  BREAST: normal without masses, tenderness or nipple discharge and no palpable axillary masses or adenopathy  CV: regular rates and rhythm, normal S1 S2, no S3 or S4 and no murmur, click or rub  ABDOMEN: soft, nontender, no hepatosplenomegaly, no masses and bowel sounds normal   (female): normal female external genitalia, normal urethral meatus, vaginal mucosa pink, moist, well rugated, and normal cervix  MS: no gross musculoskeletal defects noted, no edema  SKIN: no suspicious lesions or rashes  NEURO: Normal strength and tone, mentation intact and speech normal  PSYCH: mentation appears normal, affect normal/bright    ASSESSMENT/PLAN:       ICD-10-CM    1. Routine general medical examination at a health care facility  Z00.00 Lipid panel reflex to direct LDL Fasting     Glucose     Glucose     Lipid panel reflex to direct LDL Fasting      2. Need for hepatitis C screening test  Z11.59 Hepatitis C Screen Reflex to HCV RNA Quant and Genotype     Hepatitis C Screen Reflex to HCV RNA Quant and Genotype      3. Cervical cancer screening  Z12.4 Pap Screen with HPV - recommended age 30 - 65 years      4. Menorrhagia with irregular " "cycle  N92.1 US Pelvic Transabdominal and Transvaginal      5. HSV (herpes simplex virus) infection  B00.9 valACYclovir (VALTREX) 500 MG tablet          1-3) Screenings discussed    4) Will check a pelvic US for further evaluation. If normal I reassured the patient that her irregularity is likely nothing to worry about. When she decides she wants to conceive I can refer her to OBGYN for further discussion    5) Med renewed, no change      COUNSELING:  Reviewed preventive health counseling, as reflected in patient instructions    Estimated body mass index is 32.13 kg/m  as calculated from the following:    Height as of this encounter: 1.63 m (5' 4.17\").    Weight as of this encounter: 85.4 kg (188 lb 3.2 oz).    She reports that she has never smoked. She has never used smokeless tobacco.      Counseling Resources:  ATP IV Guidelines  Pooled Cohorts Equation Calculator  Breast Cancer Risk Calculator  BRCA-Related Cancer Risk Assessment: FHS-7 Tool  FRAX Risk Assessment  ICSI Preventive Guidelines  Dietary Guidelines for Americans, 2010  USDA's MyPlate  ASA Prophylaxis  Lung CA Screening    RHONDA Mcclendon [facility] MANAS  "

## 2022-11-09 ENCOUNTER — ANCILLARY PROCEDURE (OUTPATIENT)
Dept: ULTRASOUND IMAGING | Facility: CLINIC | Age: 32
End: 2022-11-09
Attending: PHYSICIAN ASSISTANT
Payer: COMMERCIAL

## 2022-11-09 DIAGNOSIS — N92.1 MENORRHAGIA WITH IRREGULAR CYCLE: ICD-10-CM

## 2022-11-09 DIAGNOSIS — N83.201 CYSTS OF BOTH OVARIES: Primary | ICD-10-CM

## 2022-11-09 DIAGNOSIS — N83.202 CYSTS OF BOTH OVARIES: Primary | ICD-10-CM

## 2022-11-09 PROCEDURE — 76856 US EXAM PELVIC COMPLETE: CPT | Mod: TC | Performed by: RADIOLOGY

## 2022-11-09 PROCEDURE — 76830 TRANSVAGINAL US NON-OB: CPT | Mod: TC | Performed by: RADIOLOGY

## 2022-11-10 LAB
BKR LAB AP GYN ADEQUACY: NORMAL
BKR LAB AP GYN INTERPRETATION: NORMAL
BKR LAB AP HPV REFLEX: NORMAL
BKR LAB AP PREVIOUS ABNORMAL: NORMAL
PATH REPORT.COMMENTS IMP SPEC: NORMAL
PATH REPORT.COMMENTS IMP SPEC: NORMAL
PATH REPORT.RELEVANT HX SPEC: NORMAL

## 2022-11-14 LAB
HUMAN PAPILLOMA VIRUS 16 DNA: NEGATIVE
HUMAN PAPILLOMA VIRUS 18 DNA: NEGATIVE
HUMAN PAPILLOMA VIRUS FINAL DIAGNOSIS: NORMAL
HUMAN PAPILLOMA VIRUS OTHER HR: NEGATIVE

## 2023-02-09 ENCOUNTER — OFFICE VISIT (OUTPATIENT)
Dept: FAMILY MEDICINE | Facility: CLINIC | Age: 33
End: 2023-02-09
Payer: COMMERCIAL

## 2023-02-09 ENCOUNTER — TELEPHONE (OUTPATIENT)
Dept: FAMILY MEDICINE | Facility: CLINIC | Age: 33
End: 2023-02-09

## 2023-02-09 VITALS
WEIGHT: 186 LBS | HEART RATE: 91 BPM | SYSTOLIC BLOOD PRESSURE: 117 MMHG | TEMPERATURE: 98.5 F | OXYGEN SATURATION: 100 % | BODY MASS INDEX: 31.75 KG/M2 | DIASTOLIC BLOOD PRESSURE: 77 MMHG

## 2023-02-09 DIAGNOSIS — N91.2 AMENORRHEA: Primary | ICD-10-CM

## 2023-02-09 DIAGNOSIS — Z32.01 PREGNANCY EXAMINATION OR TEST, POSITIVE RESULT: Primary | ICD-10-CM

## 2023-02-09 LAB
B-HCG SERPL-ACNC: ABNORMAL IU/L (ref 0–5)
ERYTHROCYTE [DISTWIDTH] IN BLOOD BY AUTOMATED COUNT: 13.3 % (ref 10–15)
ESTRADIOL SERPL-MCNC: 1977 PG/ML
FSH SERPL IRP2-ACNC: <0.3 MIU/ML
HCT VFR BLD AUTO: 33.1 % (ref 35–47)
HGB BLD-MCNC: 11.3 G/DL (ref 11.7–15.7)
MCH RBC QN AUTO: 30.5 PG (ref 26.5–33)
MCHC RBC AUTO-ENTMCNC: 34.1 G/DL (ref 31.5–36.5)
MCV RBC AUTO: 90 FL (ref 78–100)
PLATELET # BLD AUTO: 211 10E3/UL (ref 150–450)
PROLACTIN SERPL 3RD IS-MCNC: 168 NG/ML (ref 5–23)
RBC # BLD AUTO: 3.7 10E6/UL (ref 3.8–5.2)
TSH SERPL DL<=0.005 MIU/L-ACNC: 1.09 MU/L (ref 0.4–4)
WBC # BLD AUTO: 5.7 10E3/UL (ref 4–11)

## 2023-02-09 PROCEDURE — 99213 OFFICE O/P EST LOW 20 MIN: CPT | Performed by: PHYSICIAN ASSISTANT

## 2023-02-09 PROCEDURE — 84146 ASSAY OF PROLACTIN: CPT | Performed by: PHYSICIAN ASSISTANT

## 2023-02-09 PROCEDURE — 82670 ASSAY OF TOTAL ESTRADIOL: CPT | Performed by: PHYSICIAN ASSISTANT

## 2023-02-09 PROCEDURE — 83001 ASSAY OF GONADOTROPIN (FSH): CPT | Performed by: PHYSICIAN ASSISTANT

## 2023-02-09 PROCEDURE — 36415 COLL VENOUS BLD VENIPUNCTURE: CPT | Performed by: PHYSICIAN ASSISTANT

## 2023-02-09 PROCEDURE — 84702 CHORIONIC GONADOTROPIN TEST: CPT | Performed by: PHYSICIAN ASSISTANT

## 2023-02-09 PROCEDURE — 85027 COMPLETE CBC AUTOMATED: CPT | Performed by: PHYSICIAN ASSISTANT

## 2023-02-09 PROCEDURE — 84443 ASSAY THYROID STIM HORMONE: CPT | Performed by: PHYSICIAN ASSISTANT

## 2023-02-09 NOTE — PROGRESS NOTES
"  Assessment & Plan     Amenorrhea  Will obtain labs today to further evaluate as well as repeat US. If normal, discussed seeing ob/gyn for further evaluation as she is hoping for pregnancy soon.   - Prolactin; Future  - Follicle stimulating hormone; Future  - TSH with free T4 reflex; Future  - Estradiol; Future  - hCG Quantitative Pregnancy; Future  - CBC with platelets; Future  - US Pelvic Complete with Transvaginal; Future               BMI:   Estimated body mass index is 31.75 kg/m  as calculated from the following:    Height as of 11/8/22: 1.63 m (5' 4.17\").    Weight as of this encounter: 84.4 kg (186 lb).   Weight management plan: Discussed healthy diet and exercise guidelines        Return in about 9 months (around 11/9/2023) for Routine preventive.    Cindy Ray PA-C  [facility] LOYDA Llamas is a 32 year old, presenting for the following health issues:  Abnormal Bleeding Problem      Abnormal Bleeding Problem    History of Present Illness       Reason for visit:  Was diagnosed with ovarian cyst in November and haven't had my period for the last two months.    She eats 2-3 servings of fruits and vegetables daily.She consumes 1 sweetened beverage(s) daily.She exercises with enough effort to increase her heart rate 9 or less minutes per day.  She exercises with enough effort to increase her heart rate 3 or less days per week.   She is taking medications regularly.       Menstrual Concern  Onset/Duration: November 2022 Dx with ovarian Cysts- has not had a period since.  Description:   Duration of bleeding episodes: 0 days  Frequency of periods: (1st day of one to 1st day of next):  every 0 days before November periods were abnormal frequencies  Describe bleeding/flow:   Clots: No  Number of pads/day: 0        Cramping: none  Accompanying Signs & Symptoms:  Lightheadedness: No  Temperature intolerance: No  Nosebleeds/Easy bruising: No  Vaginal Discharge: YES  Acne: " No  Change in body hair: No  History:  Patient's last menstrual period was 11/23/2022 (exact date).  Previous normal periods: no   Contraceptive use: NO  Sexually active: YES  Any bleeding after intercourse: No  Abnormal PAP Smears: No  Precipitating or alleviating factors: None  Therapies tried and outcome: None    Is sexually active. Recently . Planning for pregnancy soon.          Review of Systems   Genitourinary: Positive for menstrual problem.            Objective    /77 (BP Location: Right arm, Patient Position: Sitting, Cuff Size: Adult Regular)   Pulse 91   Temp 98.5  F (36.9  C) (Oral)   Wt 84.4 kg (186 lb)   LMP 11/23/2022 (Exact Date)   SpO2 100%   BMI 31.75 kg/m    Body mass index is 31.75 kg/m .  Physical Exam   GENERAL: healthy, alert and no distress  NECK: no adenopathy, no asymmetry, masses, or scars and thyroid normal to palpation  RESP: lungs clear to auscultation - no rales, rhonchi or wheezes  CV: regular rate and rhythm, normal S1 S2, no S3 or S4, no murmur, click or rub, no peripheral edema and peripheral pulses strong  ABDOMEN: soft, nontender, no hepatosplenomegaly, no masses and bowel sounds normal  MS: no gross musculoskeletal defects noted, no edema

## 2023-02-09 NOTE — TELEPHONE ENCOUNTER
I left message for patient to return call to go over lab result.     She is pregnant.(Newly , wanting pregnancy) This is the cause of not having a period. No need to have the ultrasound completed (it looks like it is scheduled tomorrow).   She should follow up with ob/gyn. Referral placed - she should call to schedule.     Cindy Ray PA-C

## 2023-02-09 NOTE — TELEPHONE ENCOUNTER
Spoke with pt and notified, transferred to scheduling to assist with est care with OBGYN.    Thanks,  ZAYRA Flores  [facility]

## 2023-02-16 ENCOUNTER — PRENATAL OFFICE VISIT (OUTPATIENT)
Dept: NURSING | Facility: CLINIC | Age: 33
End: 2023-02-16
Payer: COMMERCIAL

## 2023-02-16 VITALS — BODY MASS INDEX: 31.93 KG/M2 | HEIGHT: 64 IN

## 2023-02-16 DIAGNOSIS — Z23 NEED FOR TDAP VACCINATION: ICD-10-CM

## 2023-02-16 DIAGNOSIS — Z34.00 ENCOUNTER FOR SUPERVISION OF NORMAL FIRST PREGNANCY: Primary | ICD-10-CM

## 2023-02-16 PROCEDURE — 99207 PR NO CHARGE NURSE ONLY: CPT

## 2023-02-16 NOTE — PROGRESS NOTES
Important Information for Provider:     [facility] ob nurse intake by phone, first pregnancy. Handouts reviewed, discussed genetic screening. Ultrasound scheduled for 2/21/2023, NOB with CNM 2/22/2023 at [facility]. Denies any problems at this time.    Caffeine intake/servings daily - 2  Calcium intake/servings daily - 3  Exercise 0 times weekly - describe ; encouraged walking, precautions given   Sunscreen used - Yes  Seatbelts used - Yes  Guns stored in the home - No  Self Breast Exam - Yes  Pap test up to date - Yes  Dental exam up to date -  No  Immunizations reviewed and up to date - Yes  Abuse: Current or Past (Physical, Sexual or Emotional) - No  Do you feel safe in your environment - Yes  Do you cope well with stress - Yes  Do you suffer from insomnia - Yes            Prenatal OB Questionnaire  Patient supplied answers from flow sheet for:  Prenatal OB Questionnaire.  Past Medical History  Have you ever received care for your mental health? : No  Have you ever been in a major accident or suffered serious trauma?: No  Within the last year, has anyone hit, slapped, kicked or otherwise hurt you?: No  In the last year, has anyone forced you to have sex when you didn't want to?: No    Past Medical History 2   Have you ever received a blood transfusion?: No  Would you accept a blood transfusion if was medically recommended?: Yes  Does anyone in your home smoke?: No   Is your blood type Rh negative?: Unknown  Have you ever ?: No  Have you been hospitalized for a nonsurgical reason excluding normal delivery?: No  Have you ever had an abnormal pap smear?: No    Past Medical History (Continued)  Do you have a history of abnormalities of the uterus?: No  Did your mother take CIRILO or any other hormones when she was pregnant with you?: No  Do you have any other problems we have not asked about which you feel may be important to this pregnancy?: No                     Allergies as of 2/16/2023:    Allergies as of  02/16/2023     (No Known Allergies)       Current medications are:  Current Outpatient Medications   Medication Sig Dispense Refill     Prenatal Vit-Fe Fumarate-FA (PRENATAL MULTIVITAMIN W/IRON) 27-0.8 MG tablet Take 1 tablet by mouth daily 90 tablet 3     valACYclovir (VALTREX) 500 MG tablet Take 1 tablet (500 mg) by mouth 2 times daily for 3 days 12 tablet 2         Early ultrasound screening tool:    Does patient have irregular periods?  No  Did patient use hormonal birth control in the three months prior to positive urine pregnancy test? No  Is the patient breastfeeding?  No  Is the patient 10 weeks or greater at time of education visit?  Yes

## 2023-02-21 ENCOUNTER — ANCILLARY PROCEDURE (OUTPATIENT)
Dept: ULTRASOUND IMAGING | Facility: CLINIC | Age: 33
End: 2023-02-21
Attending: ADVANCED PRACTICE MIDWIFE
Payer: COMMERCIAL

## 2023-02-21 DIAGNOSIS — Z34.00 ENCOUNTER FOR SUPERVISION OF NORMAL FIRST PREGNANCY: ICD-10-CM

## 2023-02-21 LAB
ABO/RH(D): NORMAL
ANTIBODY SCREEN: NEGATIVE
SPECIMEN EXPIRATION DATE: NORMAL

## 2023-02-21 PROCEDURE — 76801 OB US < 14 WKS SINGLE FETUS: CPT | Mod: TC | Performed by: RADIOLOGY

## 2023-02-22 ENCOUNTER — PRENATAL OFFICE VISIT (OUTPATIENT)
Dept: MIDWIFE SERVICES | Facility: CLINIC | Age: 33
End: 2023-02-22
Payer: COMMERCIAL

## 2023-02-22 VITALS
HEART RATE: 87 BPM | SYSTOLIC BLOOD PRESSURE: 130 MMHG | TEMPERATURE: 97 F | WEIGHT: 185 LBS | DIASTOLIC BLOOD PRESSURE: 68 MMHG | BODY MASS INDEX: 31.76 KG/M2

## 2023-02-22 DIAGNOSIS — Z11.3 SCREEN FOR STD (SEXUALLY TRANSMITTED DISEASE): ICD-10-CM

## 2023-02-22 DIAGNOSIS — A60.00 GENITAL HERPES SIMPLEX, UNSPECIFIED SITE: ICD-10-CM

## 2023-02-22 DIAGNOSIS — Z34.02 ENCOUNTER FOR SUPERVISION OF NORMAL FIRST PREGNANCY IN SECOND TRIMESTER: Primary | ICD-10-CM

## 2023-02-22 PROBLEM — Z34.00 SUPERVISION OF NORMAL FIRST PREGNANCY: Status: ACTIVE | Noted: 2023-02-22

## 2023-02-22 LAB
ALBUMIN UR-MCNC: NEGATIVE MG/DL
APPEARANCE UR: CLEAR
BILIRUB UR QL STRIP: NEGATIVE
COLOR UR AUTO: YELLOW
GLUCOSE UR STRIP-MCNC: NEGATIVE MG/DL
HGB UR QL STRIP: NEGATIVE
KETONES UR STRIP-MCNC: NEGATIVE MG/DL
LEUKOCYTE ESTERASE UR QL STRIP: NEGATIVE
NITRATE UR QL: NEGATIVE
PH UR STRIP: 7.5 [PH] (ref 5–7)
SP GR UR STRIP: 1.02 (ref 1–1.03)
UROBILINOGEN UR STRIP-ACNC: 0.2 E.U./DL

## 2023-02-22 PROCEDURE — 86900 BLOOD TYPING SEROLOGIC ABO: CPT | Performed by: ADVANCED PRACTICE MIDWIFE

## 2023-02-22 PROCEDURE — 99204 OFFICE O/P NEW MOD 45 MIN: CPT | Performed by: ADVANCED PRACTICE MIDWIFE

## 2023-02-22 PROCEDURE — 86780 TREPONEMA PALLIDUM: CPT | Performed by: ADVANCED PRACTICE MIDWIFE

## 2023-02-22 PROCEDURE — 87086 URINE CULTURE/COLONY COUNT: CPT | Performed by: ADVANCED PRACTICE MIDWIFE

## 2023-02-22 PROCEDURE — 87491 CHLMYD TRACH DNA AMP PROBE: CPT | Performed by: ADVANCED PRACTICE MIDWIFE

## 2023-02-22 PROCEDURE — 86850 RBC ANTIBODY SCREEN: CPT | Performed by: ADVANCED PRACTICE MIDWIFE

## 2023-02-22 PROCEDURE — 86901 BLOOD TYPING SEROLOGIC RH(D): CPT | Performed by: ADVANCED PRACTICE MIDWIFE

## 2023-02-22 PROCEDURE — 87591 N.GONORRHOEAE DNA AMP PROB: CPT | Performed by: ADVANCED PRACTICE MIDWIFE

## 2023-02-22 PROCEDURE — 81003 URINALYSIS AUTO W/O SCOPE: CPT | Performed by: ADVANCED PRACTICE MIDWIFE

## 2023-02-22 PROCEDURE — 87340 HEPATITIS B SURFACE AG IA: CPT | Performed by: ADVANCED PRACTICE MIDWIFE

## 2023-02-22 PROCEDURE — 36415 COLL VENOUS BLD VENIPUNCTURE: CPT | Performed by: ADVANCED PRACTICE MIDWIFE

## 2023-02-22 PROCEDURE — 86762 RUBELLA ANTIBODY: CPT | Performed by: ADVANCED PRACTICE MIDWIFE

## 2023-02-22 PROCEDURE — 87389 HIV-1 AG W/HIV-1&-2 AB AG IA: CPT | Performed by: ADVANCED PRACTICE MIDWIFE

## 2023-02-22 PROCEDURE — 82306 VITAMIN D 25 HYDROXY: CPT | Performed by: ADVANCED PRACTICE MIDWIFE

## 2023-02-22 PROCEDURE — 86803 HEPATITIS C AB TEST: CPT | Performed by: ADVANCED PRACTICE MIDWIFE

## 2023-02-22 RX ORDER — FAMOTIDINE 20 MG
2000 TABLET ORAL DAILY
COMMUNITY

## 2023-02-22 RX ORDER — VALACYCLOVIR HYDROCHLORIDE 500 MG/1
500 TABLET, FILM COATED ORAL 2 TIMES DAILY
Qty: 180 TABLET | Refills: 1 | Status: ON HOLD | OUTPATIENT
Start: 2023-02-22 | End: 2023-04-14

## 2023-02-22 RX ORDER — MULTIVIT WITH MINERALS/LUTEIN
500 TABLET ORAL DAILY
COMMUNITY

## 2023-02-22 NOTE — PROGRESS NOTES
13w0d   Muriel Chauhan is a 32 year old who presents to the clinic for an new ob visit. She is not a previous CNM patient. She is having a hard time with fatigue and low appetite. Has lost some weight. No nausea. She is concerned about the cyst seen on ultrasound. She was seen in Nov 2022 for pain and the bilateral cysts was found. The left ovary cyst was 2.5 cm at that time. She was advised to follow up but then got pregnant. On the scan yesterday the cyst has grown to 7.8 cm on the left ovary (or is a new cyst) and the right ovary cyst has resolved. Discussed we will discuss a plan with the MDs and get back to her. She is having pain from the cyst and has some nights where it is hard to sleep. She would prefer to not do anything for the cyst unless necessary for the safety of the pregnancy.   She has a history of genital HSV. She had one outbreak early this pregnancy. She gets outbreaks a few times per year. She had medication at home she was able to take. Discussed if she gets a second outbreak she should be on medication for the remainder of the pregnancy. RX sent today. She understands and agrees with plan of care.   She is unsure where she will delivery. Her partner is in [place], she may need to move out there. Wants to see how things go on her own here. She has two cousins who she sees regularly here in [place]. The rest of her family is in [place]. She is unsure if she stays in [place] if she wants to deliver at [place]. Does not want to go to the city. Discussed other options for delivery. She has insurance with [employer] so might have to switch to them.   She is up to date on her pap smear. Okay with GC/CT testing today. New OB labs today. Declines genetic testing. No other questions or concerns today.     Estimated Date of Delivery: Aug 30, 2023 is calculated from Patient's last menstrual period was 11/23/2022 (exact date).     She has not had bleeding since her LMP.   She has not had nausea.  Weigh loss has occurred, for a total of 5 pounds due to low appetite.   FOB is involved, Phylicia Mackay, involved, lives in [place].  OTHER CONCERNS: none     INFECTION HISTORY  HIV: no  Hepatitis B: no  Hepatitis C: no  Syphilis:  no  Tuberculosis: no   PPD- no  Herpes self: yes  Chlamydia:  no  Gonorrhea:  no  HPV: no  BV:  no  Trichomonis:  no  Chicken Pox:  YES  ====================================================  GENETIC SCREENING  Genetic screening reviewed. High Risk? No   ====================================================  PERSONAL/SOCIAL HISTORY  Lives alone.  Employment: Full time.  Her job involves moderate activity as RN.  HX OF ABUSE: no  =====================================================   REVIEW OF SYSTEMS  CONSTITUTIONAL: NEGATIVE for fever, chills  EYES: NEGATIVE for vision changes   RESP: NEGATIVE for significant cough or SOB  CV: NEGATIVE for chest pain, palpitations   GI: NEGATIVE for nausea, abdominal pain, heartburn, or change in bowel habits  : NEGATIVE for frequency, dysuria, or hematuria  MUSCULOSKELETAL: NEGATIVE for significant arthralgias or myalgia  NEURO: NEGATIVE for weakness, dizziness or paresthesias or headache  ====================================================    PHYSICAL EXAM:  /68   Pulse 87   Temp 97  F (36.1  C)   Wt 83.9 kg (185 lb)   LMP 11/23/2022 (Exact Date)   BMI 31.76 kg/m    BMI- Body mass index is 31.76 kg/m .,     RECOMMENDED WEIGHT GAIN: 15-25 lbs.  PHQ9- Today's Depression Rating was No Value exists for the : HP#PHQ9  GENERAL:  Pleasant pregnant female, alert, well groomed.   SKIN:  Warm and dry, without lesions or rashes  HEAD: Symmetrical features.  NECK:  Thyroid without enlargement and nodules.  Lymph nodes not palpable.   LUNGS:  Clear to auscultation.  HEART:  RRR without murmur.  ABDOMEN: Soft without masses , tenderness or organomegaly.  No CVA tenderness. Well healed scar from appendectomy     MUSCULOSKELETAL:  Full range of  motion  EXTREMITIES:  No edema. No significant varicosities.   GENITALIA: deferred.    =========================================  ASSESSMENT:  13w0d  (Z34.02) Encounter for supervision of normal first pregnancy in second trimester  (primary encounter diagnosis)  Plan: NEISSERIA GONORRHOEA PCR, CHLAMYDIA TRACHOMATIS        PCR, CANCELED: Vitamin D Deficiency    (A60.00) Genital herpes simplex, unspecified site  Plan: valACYclovir (VALTREX) 500 MG tablet    (Z11.3) Screen for STD (sexually transmitted disease)  Plan: NEISSERIA GONORRHOEA PCR, CHLAMYDIA TRACHOMATIS        PCR    PREGNANCY AT RISK? no  ==========================================  PLAN:  Instructed on use of triage nurse line and contacting the on call CNM after hours for an urgent need such as fever, vagina bleeding, bladder or vaginal infection, rupture of membranes,  or term labor.    Discussed the indications, uses for and false positives for quad screen, nuchal translucency and fetal survey ultrasound at 18-20 weeks gestation. Declined today.   Instructed on best evidence for: weight gain for her BMI for pregnancy; healthy diet and foods to avoid; exercise and activity during pregnancy;avoiding exposure to toxoplasmosis; and maintenance of a generally healthy lifestyle.   Discussed the harms, benefits, side effects and alternative therapies for current prescribed and OTC medications.  Follow up in 4 weeks.     KRYSTLE Bashir CNSHELDON

## 2023-02-23 LAB
C TRACH DNA SPEC QL NAA+PROBE: NEGATIVE
DEPRECATED CALCIDIOL+CALCIFEROL SERPL-MC: 36 UG/L (ref 20–75)
HBV SURFACE AG SERPL QL IA: NONREACTIVE
HCV AB SERPL QL IA: NONREACTIVE
HIV 1+2 AB+HIV1 P24 AG SERPL QL IA: NONREACTIVE
N GONORRHOEA DNA SPEC QL NAA+PROBE: NEGATIVE
RUBV IGG SERPL QL IA: 23.5 INDEX
RUBV IGG SERPL QL IA: POSITIVE
T PALLIDUM AB SER QL: NONREACTIVE

## 2023-02-24 ENCOUNTER — TRANSCRIBE ORDERS (OUTPATIENT)
Dept: MATERNAL FETAL MEDICINE | Facility: HOSPITAL | Age: 33
End: 2023-02-24
Payer: COMMERCIAL

## 2023-02-24 DIAGNOSIS — O26.90 PREGNANCY RELATED CONDITION, ANTEPARTUM: Primary | ICD-10-CM

## 2023-02-24 DIAGNOSIS — Z34.82 ENCOUNTER FOR SUPERVISION OF OTHER NORMAL PREGNANCY IN SECOND TRIMESTER: Primary | ICD-10-CM

## 2023-02-24 LAB — BACTERIA UR CULT: NO GROWTH

## 2023-03-15 ENCOUNTER — TELEPHONE (OUTPATIENT)
Dept: MIDWIFE SERVICES | Facility: CLINIC | Age: 33
End: 2023-03-15
Payer: COMMERCIAL

## 2023-03-15 NOTE — CONFIDENTIAL NOTE
"Appointment on 2/22/23:    \"She has a history of genital HSV. She had one outbreak early this pregnancy. She gets outbreaks a few times per year. She had medication at home she was able to take. Discussed if she gets a second outbreak she should be on medication for the remainder of the pregnancy. RX sent today. She understands and agrees with plan of care.\"    Patient calling with second outbreak.  Advised patient to  Rx from pharmacy and to start taking daily for suppression.    Herminia Joe RN   "

## 2023-03-22 ENCOUNTER — PRE VISIT (OUTPATIENT)
Dept: MATERNAL FETAL MEDICINE | Facility: HOSPITAL | Age: 33
End: 2023-03-22
Payer: COMMERCIAL

## 2023-03-29 ENCOUNTER — OFFICE VISIT (OUTPATIENT)
Dept: MATERNAL FETAL MEDICINE | Facility: HOSPITAL | Age: 33
End: 2023-03-29
Attending: ADVANCED PRACTICE MIDWIFE
Payer: COMMERCIAL

## 2023-03-29 ENCOUNTER — PRENATAL OFFICE VISIT (OUTPATIENT)
Dept: MIDWIFE SERVICES | Facility: CLINIC | Age: 33
End: 2023-03-29
Payer: COMMERCIAL

## 2023-03-29 ENCOUNTER — ANCILLARY PROCEDURE (OUTPATIENT)
Dept: ULTRASOUND IMAGING | Facility: HOSPITAL | Age: 33
End: 2023-03-29
Attending: ADVANCED PRACTICE MIDWIFE
Payer: COMMERCIAL

## 2023-03-29 ENCOUNTER — PRENATAL OFFICE VISIT (OUTPATIENT)
Dept: OBGYN | Facility: CLINIC | Age: 33
End: 2023-03-29
Payer: COMMERCIAL

## 2023-03-29 VITALS
WEIGHT: 193 LBS | SYSTOLIC BLOOD PRESSURE: 110 MMHG | HEART RATE: 73 BPM | BODY MASS INDEX: 33.13 KG/M2 | OXYGEN SATURATION: 100 % | DIASTOLIC BLOOD PRESSURE: 66 MMHG

## 2023-03-29 VITALS
WEIGHT: 193 LBS | BODY MASS INDEX: 33.13 KG/M2 | HEART RATE: 73 BPM | OXYGEN SATURATION: 100 % | DIASTOLIC BLOOD PRESSURE: 66 MMHG | SYSTOLIC BLOOD PRESSURE: 110 MMHG

## 2023-03-29 DIAGNOSIS — O26.90 PREGNANCY RELATED CONDITION, ANTEPARTUM: ICD-10-CM

## 2023-03-29 DIAGNOSIS — N83.209 OVARIAN CYST AFFECTING PREGNANCY IN SECOND TRIMESTER, ANTEPARTUM: Primary | ICD-10-CM

## 2023-03-29 DIAGNOSIS — N83.202 LEFT OVARIAN CYST: Primary | ICD-10-CM

## 2023-03-29 DIAGNOSIS — Z34.02 ENCOUNTER FOR SUPERVISION OF NORMAL FIRST PREGNANCY IN SECOND TRIMESTER: Primary | ICD-10-CM

## 2023-03-29 DIAGNOSIS — O34.82 OVARIAN CYST AFFECTING PREGNANCY IN SECOND TRIMESTER, ANTEPARTUM: Primary | ICD-10-CM

## 2023-03-29 PROCEDURE — 99214 OFFICE O/P EST MOD 30 MIN: CPT | Performed by: OBSTETRICS & GYNECOLOGY

## 2023-03-29 PROCEDURE — 76811 OB US DETAILED SNGL FETUS: CPT

## 2023-03-29 PROCEDURE — 76811 OB US DETAILED SNGL FETUS: CPT | Mod: 26 | Performed by: OBSTETRICS & GYNECOLOGY

## 2023-03-29 PROCEDURE — 99203 OFFICE O/P NEW LOW 30 MIN: CPT | Mod: 25 | Performed by: OBSTETRICS & GYNECOLOGY

## 2023-03-29 PROCEDURE — 99207 PR PRENATAL VISIT: CPT | Performed by: ADVANCED PRACTICE MIDWIFE

## 2023-03-29 NOTE — PROGRESS NOTES
CC:  Discuss ovarian cyst  HPI:  Muriel Chauhan is a 32 year old female Patient's last menstrual period was 11/23/2022 (exact date).  Had u/s today at [facility] and noted to have enlarging left ovarian cyst.  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  States pain is about a 6 out of 10 at today's visit    11/22 2 cm simple left ovarian cyst  2/23 7.8 cm complex left ovarian cyst  Today 15 x 14 cm complex left ovarian cyst, some smaller cystic structures at periphery and some internal heterogenicity.    Spouse just got a new job in [place] and not sure if he will be able to get time off work to support her for recovery after surgery.    She has history of prior appendectomy in [place] that had an infection and needed to be reopened and granulate from the bottom up with severe scarring in her right lower quadrant.  Worried that she may have an infection after the surgery and trouble recovering.    Allergies: Patient has no known allergies.    EXAM:  Blood pressure 110/66, pulse 73, weight 87.5 kg (193 lb), last menstrual period 11/23/2022, SpO2 100 %, not currently breastfeeding.  General - pleasant female in no acute distress.  Abdomen - soft, uterus gravid approximately to umbilicus and along patient's entire left sidewall can palpate ovarian cyst that is slightly tender to palpation.  Psychiactric - appropriate mood and affect  Neurological - alert and oriented X 3    ASSESSMENT/PLAN:  (N83.202) Left ovarian cyst  (primary encounter diagnosis)  Comment: Rapidly enlarging during pregnancy  Plan: Case Request: LAPAROTOMY, EXCISION, CYST,         OVARY, OPEN, OOPHORECTOMY, OPEN        Discussed ovarian cyst is growing during pregnancy and optimal time for surgery is in the second trimester.  Would like to accomplish surgery prior to viability, 24 weeks.  Discussed open  procedure given size of ovarian cyst and where uterus is.  Would plan vertical midline incision under general anesthesia and discussed possible ovarian cystectomy versus oophorectomy.    Reviewed hospital stay, [place] for short time after surgery, pain medications and need for 6-week no heavy lifting recovery.  A letter was done for her work today to explain that she will need to be on light duty and possibly could go back after 2 weeks.  We will monitor the baby both before and after surgery.    Reviewed risk of bleeding, infection, damage to nerves, blood vessels, bowel and bladder.  Discussed lower risk of infection since not her appendix but will do IV antibiotics.  Would like to accomplish the surgery in the next 2-3 weeks if possible.  Discussed should still be able to have a vaginal birth with the midwives this pregnancy.  Questions were answered.    Jordyn Cervantes MD

## 2023-03-29 NOTE — PROGRESS NOTES
Thank-you for referring your patient for a comprehensive ultrasound. She is noted to have a left adnexal cyst which is increasing in size, from 25 mm in Nov 2022 to 78 mm in Feb 2023 to 150 mm today.     I discussed the findings on today's ultrasound with the patient. I reviewed the limitations of ultrasound both in detecting aneuploidy and structural abnormalities.  Ultrasound can routinely detect 80-90% of structural abnormalities. She has opted not to have aneuploidy screening for this pregnancy.     We had a lengthy discussion regarding her normal fetal US, but my concern due to the rapidly growing cyst, which has almost doubled in size over the last month. She is having intermittent symptoms from the cyst, including intermittent pain, and a feeling of fullness and pressure when she tries to lie on her left side. She has no symptoms suggesting acute torsion at this time, but does have some tenderness to deep palpation over the cyst during our US.     We discussed the risks and benefits of cyst removal at different times during pregnancy. She is in the second trimester which is the optimal time for removal and the cyst has growth significantly and is causing symptoms, so I have contacted the gynecologist on call from her clinic, Dr. Kylah Cervantes, to evaluate the patient today.     We discussed that this may need to be removed during the pregnancy. We discussed my inability to accurately identify the cause of this cyst on US alone. We discussed concern for torsion or rupture if we leave the cyst alone, given its size and appearance, and how this might also increase the risk of pregnancy complications.     She will go see Dr. Cervantes now at [facility].     We have tentatively scheduled her for a follow up US with us in 3 weeks.     If you have questions regarding today's evaluation or if we can be of further service, please contact the [facility].    I spent 15 minutes  prior to the visit preparing to see the patient (reviewing medical records, prior imaging results and images and tests). I spent 10 minutes face to face with the patient during the visit with the majority (> 50% ) of the time spent on counseling and coordination of care with the patient. I spent 10 minutes after the visit with the patient documenting the visit in the electronic health record and/or communicating with other health care professionals and/or care coordination.     Total time spent on today's day of service: 35 minutes.    Luna Hong MD     Maternal Fetal Medicine  [facility] [phone]  [email or web address]

## 2023-03-29 NOTE — NURSING NOTE
Muriel Chauhan is a  at 18w0d who presents to [facility] for L2 ultrasound. Pt denies bldg/lof/change in discharge, contractions, headache, vision changes, chest pain/SOB or edema. SBAR given to Dr. Hong, see note in Epic.

## 2023-03-29 NOTE — LETTER
March 29, 2023      Muriel Chauhan  [address] [place] [address]        To Whom It May Concern:    Muriel Chauhan was seen in our clinic. We will need to do surgery during this pregnancy for an ovarian cyst. She will need to have work restrictions for 6 weeks following surgery including not standing for long periods of time and no lifting more than 10 lbs.  Light duty would probably be okay about 2 weeks after surgery.  We are in the process of determining the surgical date. Please call our office with any questions.     Estimated Date of Delivery: Aug 30, 2023      Sincerely,        JANNA LOYOLA MD

## 2023-03-29 NOTE — PROGRESS NOTES
Faustoluiza is here for a PNV and a follow up consultation with Dr Cervantes for a ovarian cyst that is growing rapidly and requires removal.  Faustoluiza US at [facility] in regards to baby is normal and that is reinforced.  Pain is the L lower abdomen layer that feels like pressure that is not made worse with activity but did not resolve with rest either.  No other concerns.  Discussed that her care can continue at the [place] office for prenatal care and possible post op care with MD's there.     ASSESSMENT: 18w0d   Ovarian Cyst,    PLAN: RTC in 6 weeks for PNC.    Consult with Dr Cervantes.      JE

## 2023-03-30 ENCOUNTER — TELEPHONE (OUTPATIENT)
Dept: OBGYN | Facility: CLINIC | Age: 33
End: 2023-03-30
Payer: COMMERCIAL

## 2023-03-30 NOTE — TELEPHONE ENCOUNTER
Type of surgery: gyn  Location of surgery: [facility] OR  Date and time of surgery: 04/12/23 2:40PM  Surgeon: Eros Cervantes assisting  Pre-Op Appt Date: surgeon  Post-Op Appt Date: not needed   Packet sent out: Yes  Pre-cert/Authorization completed:  Not Applicable  Date: 03/30/23     YAMILETH Bailey  She/her/hers  [facility] Surgery Scheduler

## 2023-04-05 DIAGNOSIS — Z01.818 PRE-OP EXAM: Primary | ICD-10-CM

## 2023-04-05 DIAGNOSIS — N83.202 LEFT OVARIAN CYST: ICD-10-CM

## 2023-04-09 LAB
ABO/RH(D): NORMAL
ANTIBODY SCREEN: NEGATIVE
SPECIMEN EXPIRATION DATE: NORMAL

## 2023-04-10 ENCOUNTER — LAB (OUTPATIENT)
Dept: LAB | Facility: CLINIC | Age: 33
End: 2023-04-10
Payer: COMMERCIAL

## 2023-04-10 DIAGNOSIS — Z01.818 PRE-OP EXAM: ICD-10-CM

## 2023-04-10 DIAGNOSIS — N83.202 LEFT OVARIAN CYST: ICD-10-CM

## 2023-04-10 LAB
ERYTHROCYTE [DISTWIDTH] IN BLOOD BY AUTOMATED COUNT: 13.3 % (ref 10–15)
HCT VFR BLD AUTO: 31.5 % (ref 35–47)
HGB BLD-MCNC: 10.3 G/DL (ref 11.7–15.7)
MCH RBC QN AUTO: 29.9 PG (ref 26.5–33)
MCHC RBC AUTO-ENTMCNC: 32.7 G/DL (ref 31.5–36.5)
MCV RBC AUTO: 92 FL (ref 78–100)
PLATELET # BLD AUTO: 265 10E3/UL (ref 150–450)
RBC # BLD AUTO: 3.44 10E6/UL (ref 3.8–5.2)
WBC # BLD AUTO: 7.6 10E3/UL (ref 4–11)

## 2023-04-10 PROCEDURE — 36415 COLL VENOUS BLD VENIPUNCTURE: CPT

## 2023-04-10 PROCEDURE — U0005 INFEC AGEN DETEC AMPLI PROBE: HCPCS

## 2023-04-10 PROCEDURE — 86900 BLOOD TYPING SEROLOGIC ABO: CPT

## 2023-04-10 PROCEDURE — 86850 RBC ANTIBODY SCREEN: CPT

## 2023-04-10 PROCEDURE — U0003 INFECTIOUS AGENT DETECTION BY NUCLEIC ACID (DNA OR RNA); SEVERE ACUTE RESPIRATORY SYNDROME CORONAVIRUS 2 (SARS-COV-2) (CORONAVIRUS DISEASE [COVID-19]), AMPLIFIED PROBE TECHNIQUE, MAKING USE OF HIGH THROUGHPUT TECHNOLOGIES AS DESCRIBED BY CMS-2020-01-R: HCPCS

## 2023-04-10 PROCEDURE — 85027 COMPLETE CBC AUTOMATED: CPT

## 2023-04-10 PROCEDURE — 86901 BLOOD TYPING SEROLOGIC RH(D): CPT

## 2023-04-11 LAB — SARS-COV-2 RNA RESP QL NAA+PROBE: NEGATIVE

## 2023-04-12 ENCOUNTER — DOCUMENTATION ONLY (OUTPATIENT)
Dept: OTHER | Facility: CLINIC | Age: 33
End: 2023-04-12
Payer: COMMERCIAL

## 2023-04-12 ENCOUNTER — ANESTHESIA (OUTPATIENT)
Dept: SURGERY | Facility: CLINIC | Age: 33
DRG: 819 | End: 2023-04-12
Payer: COMMERCIAL

## 2023-04-12 ENCOUNTER — ANESTHESIA EVENT (OUTPATIENT)
Dept: SURGERY | Facility: CLINIC | Age: 33
DRG: 819 | End: 2023-04-12
Payer: COMMERCIAL

## 2023-04-12 ENCOUNTER — HOSPITAL ENCOUNTER (INPATIENT)
Facility: CLINIC | Age: 33
LOS: 2 days | Discharge: HOME | DRG: 819 | End: 2023-04-14
Attending: OBSTETRICS & GYNECOLOGY | Admitting: OBSTETRICS & GYNECOLOGY
Payer: COMMERCIAL

## 2023-04-12 DIAGNOSIS — D50.8 OTHER IRON DEFICIENCY ANEMIA: ICD-10-CM

## 2023-04-12 DIAGNOSIS — N83.202 LEFT OVARIAN CYST: Primary | ICD-10-CM

## 2023-04-12 DIAGNOSIS — A60.00 GENITAL HERPES SIMPLEX, UNSPECIFIED SITE: ICD-10-CM

## 2023-04-12 LAB
ANION GAP SERPL CALCULATED.3IONS-SCNC: 11 MMOL/L (ref 7–15)
BUN SERPL-MCNC: 4.9 MG/DL (ref 6–20)
CALCIUM SERPL-MCNC: 8.9 MG/DL (ref 8.6–10)
CHLORIDE SERPL-SCNC: 102 MMOL/L (ref 98–107)
CREAT SERPL-MCNC: 0.49 MG/DL (ref 0.51–0.95)
DEPRECATED HCO3 PLAS-SCNC: 21 MMOL/L (ref 22–29)
ERYTHROCYTE [DISTWIDTH] IN BLOOD BY AUTOMATED COUNT: 12.6 % (ref 10–15)
GFR SERPL CREATININE-BSD FRML MDRD: >90 ML/MIN/1.73M2
GLUCOSE BLDC GLUCOMTR-MCNC: 70 MG/DL (ref 70–99)
GLUCOSE BLDC GLUCOMTR-MCNC: 96 MG/DL (ref 70–99)
GLUCOSE SERPL-MCNC: 87 MG/DL (ref 70–99)
HCT VFR BLD AUTO: 29.2 % (ref 35–47)
HGB BLD-MCNC: 9.5 G/DL (ref 11.7–15.7)
MAGNESIUM SERPL-MCNC: 1.5 MG/DL (ref 1.7–2.3)
MCH RBC QN AUTO: 29.8 PG (ref 26.5–33)
MCHC RBC AUTO-ENTMCNC: 32.5 G/DL (ref 31.5–36.5)
MCV RBC AUTO: 92 FL (ref 78–100)
PHOSPHATE SERPL-MCNC: 3.3 MG/DL (ref 2.5–4.5)
PLATELET # BLD AUTO: 237 10E3/UL (ref 150–450)
POTASSIUM SERPL-SCNC: 4 MMOL/L (ref 3.4–5.3)
RBC # BLD AUTO: 3.19 10E6/UL (ref 3.8–5.2)
SODIUM SERPL-SCNC: 134 MMOL/L (ref 136–145)
WBC # BLD AUTO: 8.8 10E3/UL (ref 4–11)

## 2023-04-12 PROCEDURE — 258N000003 HC RX IP 258 OP 636: Performed by: STUDENT IN AN ORGANIZED HEALTH CARE EDUCATION/TRAINING PROGRAM

## 2023-04-12 PROCEDURE — 0UT60ZZ RESECTION OF LEFT FALLOPIAN TUBE, OPEN APPROACH: ICD-10-PCS | Performed by: OBSTETRICS & GYNECOLOGY

## 2023-04-12 PROCEDURE — 120N000002 HC R&B MED SURG/OB UMMC

## 2023-04-12 PROCEDURE — 58720 REMOVAL OF OVARY/TUBE(S): CPT | Mod: 80 | Performed by: OBSTETRICS & GYNECOLOGY

## 2023-04-12 PROCEDURE — 58720 REMOVAL OF OVARY/TUBE(S): CPT | Mod: GC | Performed by: OBSTETRICS & GYNECOLOGY

## 2023-04-12 PROCEDURE — 360N000076 HC SURGERY LEVEL 3, PER MIN: Performed by: OBSTETRICS & GYNECOLOGY

## 2023-04-12 PROCEDURE — 82947 ASSAY GLUCOSE BLOOD QUANT: CPT | Performed by: STUDENT IN AN ORGANIZED HEALTH CARE EDUCATION/TRAINING PROGRAM

## 2023-04-12 PROCEDURE — 85027 COMPLETE CBC AUTOMATED: CPT | Performed by: STUDENT IN AN ORGANIZED HEALTH CARE EDUCATION/TRAINING PROGRAM

## 2023-04-12 PROCEDURE — 250N000011 HC RX IP 250 OP 636: Performed by: OBSTETRICS & GYNECOLOGY

## 2023-04-12 PROCEDURE — 88305 TISSUE EXAM BY PATHOLOGIST: CPT | Mod: 26 | Performed by: STUDENT IN AN ORGANIZED HEALTH CARE EDUCATION/TRAINING PROGRAM

## 2023-04-12 PROCEDURE — 250N000025 HC SEVOFLURANE, PER MIN: Performed by: OBSTETRICS & GYNECOLOGY

## 2023-04-12 PROCEDURE — 88307 TISSUE EXAM BY PATHOLOGIST: CPT | Mod: 26 | Performed by: PATHOLOGY

## 2023-04-12 PROCEDURE — 999N000054 HC STATISTIC EKG NON-CHARGEABLE

## 2023-04-12 PROCEDURE — 250N000013 HC RX MED GY IP 250 OP 250 PS 637: Performed by: OBSTETRICS & GYNECOLOGY

## 2023-04-12 PROCEDURE — 88307 TISSUE EXAM BY PATHOLOGIST: CPT | Mod: TC | Performed by: OBSTETRICS & GYNECOLOGY

## 2023-04-12 PROCEDURE — 258N000003 HC RX IP 258 OP 636: Performed by: NURSE ANESTHETIST, CERTIFIED REGISTERED

## 2023-04-12 PROCEDURE — 3E0P05Z INTRODUCTION OF ADHESION BARRIER INTO FEMALE REPRODUCTIVE, OPEN APPROACH: ICD-10-PCS | Performed by: OBSTETRICS & GYNECOLOGY

## 2023-04-12 PROCEDURE — 250N000011 HC RX IP 250 OP 636: Performed by: NURSE ANESTHETIST, CERTIFIED REGISTERED

## 2023-04-12 PROCEDURE — 88112 CYTOPATH CELL ENHANCE TECH: CPT | Mod: 26 | Performed by: STUDENT IN AN ORGANIZED HEALTH CARE EDUCATION/TRAINING PROGRAM

## 2023-04-12 PROCEDURE — C1765 ADHESION BARRIER: HCPCS | Performed by: OBSTETRICS & GYNECOLOGY

## 2023-04-12 PROCEDURE — 370N000017 HC ANESTHESIA TECHNICAL FEE, PER MIN: Performed by: OBSTETRICS & GYNECOLOGY

## 2023-04-12 PROCEDURE — 250N000009 HC RX 250: Performed by: NURSE ANESTHETIST, CERTIFIED REGISTERED

## 2023-04-12 PROCEDURE — 84100 ASSAY OF PHOSPHORUS: CPT | Performed by: STUDENT IN AN ORGANIZED HEALTH CARE EDUCATION/TRAINING PROGRAM

## 2023-04-12 PROCEDURE — 999N000141 HC STATISTIC PRE-PROCEDURE NURSING ASSESSMENT: Performed by: OBSTETRICS & GYNECOLOGY

## 2023-04-12 PROCEDURE — 250N000011 HC RX IP 250 OP 636: Performed by: ANESTHESIOLOGY

## 2023-04-12 PROCEDURE — 0UT10ZZ RESECTION OF LEFT OVARY, OPEN APPROACH: ICD-10-PCS | Performed by: OBSTETRICS & GYNECOLOGY

## 2023-04-12 PROCEDURE — 272N000001 HC OR GENERAL SUPPLY STERILE: Performed by: OBSTETRICS & GYNECOLOGY

## 2023-04-12 PROCEDURE — 93010 ELECTROCARDIOGRAM REPORT: CPT | Performed by: INTERNAL MEDICINE

## 2023-04-12 PROCEDURE — 36415 COLL VENOUS BLD VENIPUNCTURE: CPT | Performed by: STUDENT IN AN ORGANIZED HEALTH CARE EDUCATION/TRAINING PROGRAM

## 2023-04-12 PROCEDURE — 710N000010 HC RECOVERY PHASE 1, LEVEL 2, PER MIN: Performed by: OBSTETRICS & GYNECOLOGY

## 2023-04-12 PROCEDURE — 88112 CYTOPATH CELL ENHANCE TECH: CPT | Mod: TC | Performed by: OBSTETRICS & GYNECOLOGY

## 2023-04-12 PROCEDURE — 83735 ASSAY OF MAGNESIUM: CPT | Performed by: STUDENT IN AN ORGANIZED HEALTH CARE EDUCATION/TRAINING PROGRAM

## 2023-04-12 PROCEDURE — 250N000013 HC RX MED GY IP 250 OP 250 PS 637: Performed by: STUDENT IN AN ORGANIZED HEALTH CARE EDUCATION/TRAINING PROGRAM

## 2023-04-12 RX ORDER — NALOXONE HYDROCHLORIDE 0.4 MG/ML
0.2 INJECTION, SOLUTION INTRAMUSCULAR; INTRAVENOUS; SUBCUTANEOUS
Status: DISCONTINUED | OUTPATIENT
Start: 2023-04-12 | End: 2023-04-12 | Stop reason: HOSPADM

## 2023-04-12 RX ORDER — HYDROMORPHONE HCL IN WATER/PF 6 MG/30 ML
0.2 PATIENT CONTROLLED ANALGESIA SYRINGE INTRAVENOUS
Status: DISCONTINUED | OUTPATIENT
Start: 2023-04-12 | End: 2023-04-13

## 2023-04-12 RX ORDER — ACETAMINOPHEN 325 MG/1
975 TABLET ORAL ONCE
Status: COMPLETED | OUTPATIENT
Start: 2023-04-12 | End: 2023-04-12

## 2023-04-12 RX ORDER — ONDANSETRON 2 MG/ML
INJECTION INTRAMUSCULAR; INTRAVENOUS PRN
Status: DISCONTINUED | OUTPATIENT
Start: 2023-04-12 | End: 2023-04-12

## 2023-04-12 RX ORDER — ACETAMINOPHEN 325 MG/1
975 TABLET ORAL EVERY 6 HOURS
Status: DISCONTINUED | OUTPATIENT
Start: 2023-04-12 | End: 2023-04-14 | Stop reason: HOSPADM

## 2023-04-12 RX ORDER — FLUMAZENIL 0.1 MG/ML
0.2 INJECTION, SOLUTION INTRAVENOUS
Status: DISCONTINUED | OUTPATIENT
Start: 2023-04-12 | End: 2023-04-12 | Stop reason: HOSPADM

## 2023-04-12 RX ORDER — DEXAMETHASONE SODIUM PHOSPHATE 4 MG/ML
INJECTION, SOLUTION INTRA-ARTICULAR; INTRALESIONAL; INTRAMUSCULAR; INTRAVENOUS; SOFT TISSUE PRN
Status: DISCONTINUED | OUTPATIENT
Start: 2023-04-12 | End: 2023-04-12

## 2023-04-12 RX ORDER — OXYCODONE HYDROCHLORIDE 5 MG/1
5 TABLET ORAL EVERY 4 HOURS PRN
Status: DISCONTINUED | OUTPATIENT
Start: 2023-04-12 | End: 2023-04-14 | Stop reason: HOSPADM

## 2023-04-12 RX ORDER — KETOROLAC TROMETHAMINE 30 MG/ML
INJECTION, SOLUTION INTRAMUSCULAR; INTRAVENOUS PRN
Status: DISCONTINUED | OUTPATIENT
Start: 2023-04-12 | End: 2023-04-12

## 2023-04-12 RX ORDER — PROPOFOL 10 MG/ML
INJECTION, EMULSION INTRAVENOUS CONTINUOUS PRN
Status: DISCONTINUED | OUTPATIENT
Start: 2023-04-12 | End: 2023-04-12

## 2023-04-12 RX ORDER — ONDANSETRON 4 MG/1
4 TABLET, ORALLY DISINTEGRATING ORAL EVERY 30 MIN PRN
Status: DISCONTINUED | OUTPATIENT
Start: 2023-04-12 | End: 2023-04-12 | Stop reason: HOSPADM

## 2023-04-12 RX ORDER — LIDOCAINE 4 G/G
2 PATCH TOPICAL
Status: DISCONTINUED | OUTPATIENT
Start: 2023-04-12 | End: 2023-04-14 | Stop reason: HOSPADM

## 2023-04-12 RX ORDER — SODIUM CHLORIDE, SODIUM LACTATE, POTASSIUM CHLORIDE, CALCIUM CHLORIDE 600; 310; 30; 20 MG/100ML; MG/100ML; MG/100ML; MG/100ML
INJECTION, SOLUTION INTRAVENOUS CONTINUOUS
Status: DISCONTINUED | OUTPATIENT
Start: 2023-04-12 | End: 2023-04-12 | Stop reason: HOSPADM

## 2023-04-12 RX ORDER — ONDANSETRON 4 MG/1
4 TABLET, ORALLY DISINTEGRATING ORAL EVERY 6 HOURS PRN
Status: DISCONTINUED | OUTPATIENT
Start: 2023-04-12 | End: 2023-04-14 | Stop reason: HOSPADM

## 2023-04-12 RX ORDER — PRENATAL VIT/IRON FUM/FOLIC AC 27MG-0.8MG
1 TABLET ORAL DAILY
Status: DISCONTINUED | OUTPATIENT
Start: 2023-04-13 | End: 2023-04-14 | Stop reason: HOSPADM

## 2023-04-12 RX ORDER — MAGNESIUM SULFATE HEPTAHYDRATE 40 MG/ML
4 INJECTION, SOLUTION INTRAVENOUS ONCE
Status: COMPLETED | OUTPATIENT
Start: 2023-04-12 | End: 2023-04-12

## 2023-04-12 RX ORDER — OXYCODONE HYDROCHLORIDE 10 MG/1
10 TABLET ORAL EVERY 4 HOURS PRN
Status: DISCONTINUED | OUTPATIENT
Start: 2023-04-12 | End: 2023-04-14 | Stop reason: HOSPADM

## 2023-04-12 RX ORDER — FENTANYL CITRATE 50 UG/ML
25 INJECTION, SOLUTION INTRAMUSCULAR; INTRAVENOUS EVERY 5 MIN PRN
Status: DISCONTINUED | OUTPATIENT
Start: 2023-04-12 | End: 2023-04-12 | Stop reason: HOSPADM

## 2023-04-12 RX ORDER — NALOXONE HYDROCHLORIDE 0.4 MG/ML
0.4 INJECTION, SOLUTION INTRAMUSCULAR; INTRAVENOUS; SUBCUTANEOUS
Status: DISCONTINUED | OUTPATIENT
Start: 2023-04-12 | End: 2023-04-14 | Stop reason: HOSPADM

## 2023-04-12 RX ORDER — NALOXONE HYDROCHLORIDE 0.4 MG/ML
0.4 INJECTION, SOLUTION INTRAMUSCULAR; INTRAVENOUS; SUBCUTANEOUS
Status: DISCONTINUED | OUTPATIENT
Start: 2023-04-12 | End: 2023-04-12 | Stop reason: HOSPADM

## 2023-04-12 RX ORDER — NALOXONE HYDROCHLORIDE 0.4 MG/ML
0.2 INJECTION, SOLUTION INTRAMUSCULAR; INTRAVENOUS; SUBCUTANEOUS
Status: DISCONTINUED | OUTPATIENT
Start: 2023-04-12 | End: 2023-04-14 | Stop reason: HOSPADM

## 2023-04-12 RX ORDER — ONDANSETRON 2 MG/ML
4 INJECTION INTRAMUSCULAR; INTRAVENOUS EVERY 30 MIN PRN
Status: DISCONTINUED | OUTPATIENT
Start: 2023-04-12 | End: 2023-04-12 | Stop reason: HOSPADM

## 2023-04-12 RX ORDER — ACETAMINOPHEN 325 MG/1
650 TABLET ORAL EVERY 6 HOURS
Status: DISCONTINUED | OUTPATIENT
Start: 2023-04-15 | End: 2023-04-14 | Stop reason: HOSPADM

## 2023-04-12 RX ORDER — KETOROLAC TROMETHAMINE 15 MG/ML
15 INJECTION, SOLUTION INTRAMUSCULAR; INTRAVENOUS EVERY 6 HOURS
Status: DISCONTINUED | OUTPATIENT
Start: 2023-04-12 | End: 2023-04-13

## 2023-04-12 RX ORDER — LIDOCAINE 40 MG/G
CREAM TOPICAL
Status: DISCONTINUED | OUTPATIENT
Start: 2023-04-12 | End: 2023-04-14 | Stop reason: HOSPADM

## 2023-04-12 RX ORDER — ONDANSETRON 2 MG/ML
4 INJECTION INTRAMUSCULAR; INTRAVENOUS EVERY 6 HOURS PRN
Status: DISCONTINUED | OUTPATIENT
Start: 2023-04-12 | End: 2023-04-14 | Stop reason: HOSPADM

## 2023-04-12 RX ORDER — BISACODYL 10 MG
10 SUPPOSITORY, RECTAL RECTAL DAILY PRN
Status: DISCONTINUED | OUTPATIENT
Start: 2023-04-12 | End: 2023-04-14 | Stop reason: HOSPADM

## 2023-04-12 RX ORDER — FENTANYL CITRATE 50 UG/ML
25-50 INJECTION, SOLUTION INTRAMUSCULAR; INTRAVENOUS
Status: DISCONTINUED | OUTPATIENT
Start: 2023-04-12 | End: 2023-04-12 | Stop reason: HOSPADM

## 2023-04-12 RX ORDER — SODIUM CHLORIDE, SODIUM LACTATE, POTASSIUM CHLORIDE, CALCIUM CHLORIDE 600; 310; 30; 20 MG/100ML; MG/100ML; MG/100ML; MG/100ML
INJECTION, SOLUTION INTRAVENOUS CONTINUOUS
Status: DISCONTINUED | OUTPATIENT
Start: 2023-04-12 | End: 2023-04-13

## 2023-04-12 RX ORDER — HYDROMORPHONE HYDROCHLORIDE 1 MG/ML
0.4 INJECTION, SOLUTION INTRAMUSCULAR; INTRAVENOUS; SUBCUTANEOUS EVERY 5 MIN PRN
Status: DISCONTINUED | OUTPATIENT
Start: 2023-04-12 | End: 2023-04-12 | Stop reason: HOSPADM

## 2023-04-12 RX ORDER — HYDROMORPHONE HYDROCHLORIDE 1 MG/ML
0.2 INJECTION, SOLUTION INTRAMUSCULAR; INTRAVENOUS; SUBCUTANEOUS EVERY 5 MIN PRN
Status: DISCONTINUED | OUTPATIENT
Start: 2023-04-12 | End: 2023-04-12 | Stop reason: HOSPADM

## 2023-04-12 RX ORDER — AMOXICILLIN 250 MG
1 CAPSULE ORAL 2 TIMES DAILY
Status: DISCONTINUED | OUTPATIENT
Start: 2023-04-12 | End: 2023-04-14 | Stop reason: HOSPADM

## 2023-04-12 RX ORDER — LIDOCAINE 40 MG/G
CREAM TOPICAL
Status: DISCONTINUED | OUTPATIENT
Start: 2023-04-12 | End: 2023-04-12 | Stop reason: HOSPADM

## 2023-04-12 RX ORDER — HYDROMORPHONE HCL IN WATER/PF 6 MG/30 ML
0.4 PATIENT CONTROLLED ANALGESIA SYRINGE INTRAVENOUS
Status: DISCONTINUED | OUTPATIENT
Start: 2023-04-12 | End: 2023-04-13

## 2023-04-12 RX ORDER — FENTANYL CITRATE 50 UG/ML
50 INJECTION, SOLUTION INTRAMUSCULAR; INTRAVENOUS EVERY 5 MIN PRN
Status: DISCONTINUED | OUTPATIENT
Start: 2023-04-12 | End: 2023-04-12 | Stop reason: HOSPADM

## 2023-04-12 RX ORDER — PROPOFOL 10 MG/ML
INJECTION, EMULSION INTRAVENOUS PRN
Status: DISCONTINUED | OUTPATIENT
Start: 2023-04-12 | End: 2023-04-12

## 2023-04-12 RX ORDER — SODIUM CHLORIDE, SODIUM LACTATE, POTASSIUM CHLORIDE, CALCIUM CHLORIDE 600; 310; 30; 20 MG/100ML; MG/100ML; MG/100ML; MG/100ML
INJECTION, SOLUTION INTRAVENOUS CONTINUOUS PRN
Status: DISCONTINUED | OUTPATIENT
Start: 2023-04-12 | End: 2023-04-12

## 2023-04-12 RX ORDER — CEFAZOLIN SODIUM/WATER 2 G/20 ML
2 SYRINGE (ML) INTRAVENOUS
Status: DISCONTINUED | OUTPATIENT
Start: 2023-04-12 | End: 2023-04-12 | Stop reason: HOSPADM

## 2023-04-12 RX ORDER — FENTANYL CITRATE 50 UG/ML
INJECTION, SOLUTION INTRAMUSCULAR; INTRAVENOUS PRN
Status: DISCONTINUED | OUTPATIENT
Start: 2023-04-12 | End: 2023-04-12

## 2023-04-12 RX ORDER — PROCHLORPERAZINE MALEATE 5 MG
10 TABLET ORAL EVERY 6 HOURS PRN
Status: DISCONTINUED | OUTPATIENT
Start: 2023-04-12 | End: 2023-04-14 | Stop reason: HOSPADM

## 2023-04-12 RX ORDER — CEFAZOLIN SODIUM/WATER 2 G/20 ML
2 SYRINGE (ML) INTRAVENOUS SEE ADMIN INSTRUCTIONS
Status: DISCONTINUED | OUTPATIENT
Start: 2023-04-12 | End: 2023-04-12 | Stop reason: HOSPADM

## 2023-04-12 RX ORDER — FAMOTIDINE 10 MG
10 TABLET ORAL 2 TIMES DAILY
Status: DISCONTINUED | OUTPATIENT
Start: 2023-04-13 | End: 2023-04-14 | Stop reason: HOSPADM

## 2023-04-12 RX ADMIN — PROPOFOL 50 MG: 10 INJECTION, EMULSION INTRAVENOUS at 15:49

## 2023-04-12 RX ADMIN — MAGNESIUM SULFATE HEPTAHYDRATE 4 G: 40 INJECTION, SOLUTION INTRAVENOUS at 21:43

## 2023-04-12 RX ADMIN — SODIUM CHLORIDE, POTASSIUM CHLORIDE, SODIUM LACTATE AND CALCIUM CHLORIDE: 600; 310; 30; 20 INJECTION, SOLUTION INTRAVENOUS at 21:42

## 2023-04-12 RX ADMIN — Medication 2 G: at 15:52

## 2023-04-12 RX ADMIN — HYDROMORPHONE HYDROCHLORIDE 0.4 MG: 1 INJECTION, SOLUTION INTRAMUSCULAR; INTRAVENOUS; SUBCUTANEOUS at 17:55

## 2023-04-12 RX ADMIN — ACETAMINOPHEN 975 MG: 325 TABLET ORAL at 21:38

## 2023-04-12 RX ADMIN — PROPOFOL 25 MCG/KG/MIN: 10 INJECTION, EMULSION INTRAVENOUS at 15:55

## 2023-04-12 RX ADMIN — SODIUM CHLORIDE, POTASSIUM CHLORIDE, SODIUM LACTATE AND CALCIUM CHLORIDE 500 ML: 600; 310; 30; 20 INJECTION, SOLUTION INTRAVENOUS at 17:50

## 2023-04-12 RX ADMIN — DEXAMETHASONE SODIUM PHOSPHATE 8 MG: 4 INJECTION, SOLUTION INTRA-ARTICULAR; INTRALESIONAL; INTRAMUSCULAR; INTRAVENOUS; SOFT TISSUE at 16:04

## 2023-04-12 RX ADMIN — HYDROMORPHONE HYDROCHLORIDE 0.2 MG: 1 INJECTION, SOLUTION INTRAMUSCULAR; INTRAVENOUS; SUBCUTANEOUS at 18:02

## 2023-04-12 RX ADMIN — FENTANYL CITRATE 50 MCG: 50 INJECTION, SOLUTION INTRAMUSCULAR; INTRAVENOUS at 17:06

## 2023-04-12 RX ADMIN — LIDOCAINE PATCH 4% 2 PATCH: 40 PATCH TOPICAL at 21:39

## 2023-04-12 RX ADMIN — SODIUM CHLORIDE, POTASSIUM CHLORIDE, SODIUM LACTATE AND CALCIUM CHLORIDE: 600; 310; 30; 20 INJECTION, SOLUTION INTRAVENOUS at 15:40

## 2023-04-12 RX ADMIN — SUGAMMADEX 200 MG: 100 INJECTION, SOLUTION INTRAVENOUS at 17:10

## 2023-04-12 RX ADMIN — PROPOFOL 50 MG: 10 INJECTION, EMULSION INTRAVENOUS at 15:52

## 2023-04-12 RX ADMIN — Medication 50 MG: at 15:49

## 2023-04-12 RX ADMIN — PROPOFOL 200 MG: 10 INJECTION, EMULSION INTRAVENOUS at 15:48

## 2023-04-12 RX ADMIN — PHENYLEPHRINE HYDROCHLORIDE 150 MCG: 10 INJECTION INTRAVENOUS at 16:05

## 2023-04-12 RX ADMIN — FENTANYL CITRATE 50 MCG: 50 INJECTION, SOLUTION INTRAMUSCULAR; INTRAVENOUS at 16:04

## 2023-04-12 RX ADMIN — IBUPROFEN 800 MG: 600 TABLET ORAL at 23:40

## 2023-04-12 RX ADMIN — SENNOSIDES AND DOCUSATE SODIUM 1 TABLET: 50; 8.6 TABLET ORAL at 20:45

## 2023-04-12 RX ADMIN — PHENYLEPHRINE HYDROCHLORIDE 150 MCG: 10 INJECTION INTRAVENOUS at 16:35

## 2023-04-12 RX ADMIN — PHENYLEPHRINE HYDROCHLORIDE 150 MCG: 10 INJECTION INTRAVENOUS at 16:31

## 2023-04-12 RX ADMIN — PHENYLEPHRINE HYDROCHLORIDE 0.3 MCG/KG/MIN: 10 INJECTION INTRAVENOUS at 15:55

## 2023-04-12 RX ADMIN — KETOROLAC TROMETHAMINE 15 MG: 30 INJECTION, SOLUTION INTRAMUSCULAR at 17:11

## 2023-04-12 RX ADMIN — PHENYLEPHRINE HYDROCHLORIDE 200 MCG: 10 INJECTION INTRAVENOUS at 15:48

## 2023-04-12 RX ADMIN — ACETAMINOPHEN 975 MG: 325 TABLET ORAL at 13:29

## 2023-04-12 RX ADMIN — FENTANYL CITRATE 50 MCG: 50 INJECTION, SOLUTION INTRAMUSCULAR; INTRAVENOUS at 17:33

## 2023-04-12 RX ADMIN — HYDROMORPHONE HYDROCHLORIDE 0.4 MG: 1 INJECTION, SOLUTION INTRAMUSCULAR; INTRAVENOUS; SUBCUTANEOUS at 18:25

## 2023-04-12 RX ADMIN — ONDANSETRON 4 MG: 2 INJECTION INTRAMUSCULAR; INTRAVENOUS at 17:07

## 2023-04-12 RX ADMIN — HYDROMORPHONE HYDROCHLORIDE 0.4 MG: 1 INJECTION, SOLUTION INTRAMUSCULAR; INTRAVENOUS; SUBCUTANEOUS at 17:47

## 2023-04-12 ASSESSMENT — ACTIVITIES OF DAILY LIVING (ADL)
ADLS_ACUITY_SCORE: 18

## 2023-04-12 NOTE — PROGRESS NOTES
SPIRITUAL HEALTH SERVICES  [facility] ([facility]) pre-op   PRE-SURGERY VISIT    Had pre-surgery visit with pt and her friend and cousin.  Provided spiritual support, prayer.     Disha Benítez [facility]  Associate    Pager: [phone]

## 2023-04-12 NOTE — ANESTHESIA PROCEDURE NOTES
Airway       Patient location during procedure: OR       Procedure Start/Stop Times: 4/12/2023 3:51 PM  Staff -        Anesthesiologist:  Behrens, Christopher J, MD       CRNA: Cindy Jean Baptiste APRN CRNA       Other Anesthesia Staff: Cal Bee       Performed By: MONIKA and with CRNAs       Procedure performed by resident/fellow/CRNA in presence of a teaching physician.    Consent for Airway        Urgency: elective  Indications and Patient Condition       Indications for airway management: manoj-procedural       Induction type:intravenous       Mask difficulty assessment: 2 - vent by mask + OA or adjuvant +/- NMBA    Final Airway Details       Final airway type: endotracheal airway       Successful airway: Oral and ETT - single  Endotracheal Airway Details        ETT size (mm): 6.5       Cuffed: yes       Successful intubation technique: video laryngoscopy       VL Blade Size: Glidescope 3       Grade View of Cords: 1       Adjucts: stylet       Position: Right       Measured from: gums/teeth       Secured at (cm): 22       Bite block used: None    Post intubation assessment        Placement verified by: capnometry, equal breath sounds and chest rise        Number of attempts at approach: 1       Number of other approaches attempted: 0       Secured with: silk tape       Ease of procedure: easy       Dentition: Intact and Unchanged    Medication(s) Administered   Medication Administration Time: 4/12/2023 3:51 PM

## 2023-04-12 NOTE — PROGRESS NOTES
Discussed risks and benefits of TAP block with patient at length, after lengthy discussion, patient would like to go ahead with surgery without TAP block.    Ludwig Adame  PGY4 Anesthesiology

## 2023-04-12 NOTE — OP NOTE
[facility]   GYNECOLOGY OPERATIVE NOTE     Surgery Date:  2023  Surgeon(s): Jordyn Cervantes MD  Assistants:  Lanny Cooney MD--The assistance of this surgeon was required due to the need for additional skilled surgical hands to retract and hold instruments due to the nature of the surgical procedure and risk of complications with large cyst on gravid patient.    Padmini Harrison MD, PGY4; Daysi Stephen MD    Preoperative Diagnoses:  1.  at 20w0d  2. Large left ovarian cyst    Postoperative diagnoses:  1.Same    Procedure performed: Exploratory laparotomy, left salpingoophorectomy, pelvic washings    Anesthesia:  GETA  Est Blood Loss (mL): 100 mL  Fluid replacement: 800 mL crystalloid.   Specimens: Left ovarian cyst/ovary, left fallopian tube, pelvic washings  Complications: None      Operative findings:   Large intraabdominal cyst to the ribs on abdominal exam. Uterus displaced to the right. On laparotomy: 20 cm size multiloculated cyst with dark brown, thin fluid. No normal appearing ovarian tissue identified. Normal appearing fallopian tubes. Normal appearing right ovary. 20 week size gravid uterus. No intraabdominal adhesions.     Indication: Muriel Chauhan is a 32 year old,  with a quickly growing left ovarian cyst. Given concern for ongoing growth and potential harm to developing fetus, the above stated procedure was recommended.      Procedure details:  After obtaining informed consent the patient was taken to the operating room. She received Ancef prior to the skin incision. She was placed in the dorsal supine position. She underwent GETA without difficulty. A anguiano catheter was placed. She was prepped and draped in typical sterile fashion.     A vertical midline infraumbilical skin incision was made using the scalpel and the incision was carried down to the fascia using electrocautery. The fascia was scored and extended. The rectus was dissected off  of the fascia.    The peritoneum was entered sharply and extended by digital dissection and electrocautery with care to avoid the bladder. Pelvic washings were obtained and sent for cytology. The large cyst was identified. It extended up to left ribcage. It was incidentally ruptured during delivery through the skin incision. At least 1.5 liters of light brown fluid came out of the abdomen. Given bleeding from the left fallopian tube as well as no normal appearing ovary tissue and bleeding from the cyst wall, the decision was made to proceed with left salpingo oophorectomy. The UO was clamped, cut and suture ligated using 0 Vicryl. The IP was similarly clamped, cut and doubly ligated using 0 Vicryl. The pedicle was hemostatic. The specimen was sent to pathology. The abdomen was copiously irrigated until clear.     The abdominal wall was examined and also found to be hemostatic. Seprafilm was placed over the uterus.  The fascia was closed with a running suture of 0 Looped PDS.  Subcutaneous tissue was irrigated. Areas that were not hemostatic were controlled with cautery. The subcutaneous tissue was closed with 3-0 plain gut. The sub dermal layer was closed with 3-0 Monocryl. The skin was closed with 4-0 Monocryl and covered with Exophin The patient tolerated the procedure well and was taken to the recovery room in stable condition. All sponge, needle and instrument counts were correct x2.     Dr. Cervantes was present for the entire procedure.     Padmini Harrison MD  Obstetrics and Gyncology, PGY-4  April 12, 2023, 5:30 PM.    I was present and scrubbed for entirety of the surgical case and  agree with note as edited to reflect findings.      JANNA CERVANTES MD

## 2023-04-12 NOTE — ANESTHESIA PREPROCEDURE EVALUATION
Anesthesia Pre-Procedure Evaluation    Patient: Muriel Chauhan   MRN: [id number] : 1990        Procedure : Procedure(s):  LAPAROTOMY  EXCISION, CYST, OVARY, OPEN LEFT  POSSIBLE LEFT OOPHORECTOMY, OPEN          Past Medical History:   Diagnosis Date     Acid reflux      Herpes simplex virus (HSV) infection      PONV (postoperative nausea and vomiting)      Urinary tract infection       Past Surgical History:   Procedure Laterality Date     APPENDECTOMY  2010      No Known Allergies   Social History     Tobacco Use     Smoking status: Never     Smokeless tobacco: Never   Vaping Use     Vaping status: Never Used   Substance Use Topics     Alcohol use: Not Currently     Comment: social      Wt Readings from Last 1 Encounters:   23 87.7 kg (193 lb 5.5 oz)        Anesthesia Evaluation   Pt has had prior anesthetic.     History of anesthetic complications  - PONV.      ROS/MED HX  ENT/Pulmonary:       Neurologic:       Cardiovascular:    (-) murmur and wheezes   METS/Exercise Tolerance:     Hematologic:       Musculoskeletal:       GI/Hepatic:     (+) GERD,     Renal/Genitourinary:       Endo:       Psychiatric/Substance Use:       Infectious Disease:       Malignancy:       Other:            Physical Exam    Airway        Mallampati: II   TM distance: > 3 FB   Neck ROM: full   Mouth opening: > 3 cm    Respiratory Devices and Support         Dental           Cardiovascular          Rhythm and rate: regular and normal (-) no systolic click and no murmur    Pulmonary   pulmonary exam normal        breath sounds clear to auscultation   (-) no wheezes        OUTSIDE LABS:  CBC:   Lab Results   Component Value Date    WBC 7.6 04/10/2023    WBC 5.7 2023    HGB 10.3 (L) 04/10/2023    HGB 11.3 (L) 2023    HCT 31.5 (L) 04/10/2023    HCT 33.1 (L) 2023     04/10/2023     2023     BMP:   Lab Results   Component Value Date     2019     2019     POTASSIUM 4.0 12/26/2019    POTASSIUM 4.2 12/08/2019    CHLORIDE 109 (H) 12/26/2019    CHLORIDE 105 12/08/2019    CO2 24 12/26/2019    CO2 27 12/08/2019    BUN 11 12/26/2019    BUN 13 12/08/2019    CR 0.75 12/26/2019    CR 0.94 12/08/2019    GLC 70 04/12/2023    GLC 99 11/08/2022     COAGS: No results found for: PTT, INR, FIBR  POC: No results found for: BGM, HCG, HCGS  HEPATIC:   Lab Results   Component Value Date    ALBUMIN 3.8 12/08/2019    PROTTOTAL 7.8 12/08/2019    ALT 14 12/08/2019    AST 15 12/08/2019    ALKPHOS 51 12/08/2019    BILITOTAL 0.2 12/08/2019     OTHER:   Lab Results   Component Value Date    JESUS 9.2 12/26/2019    LIPASE 13 12/08/2019    TSH 1.09 02/09/2023       Anesthesia Plan    ASA Status:  2   NPO Status:  NPO Appropriate    Anesthesia Type: General.     - Airway: ETT   Induction: Intravenous.   Maintenance: Balanced.   Techniques and Equipment:     - Lines/Monitors: Arterial Line, 2nd IV     Consents    Anesthesia Plan(s) and associated risks, benefits, and realistic alternatives discussed. Questions answered and patient/representative(s) expressed understanding.    - Discussed:     - Discussed with:  Patient      - Extended Intubation/Ventilatory Support Discussed: Yes.      - Patient is DNR/DNI Status: No    Use of blood products discussed: Yes.     - Discussed with: Patient.     Postoperative Care    Pain management: IV analgesics.   PONV prophylaxis: Ondansetron (or other 5HT-3), Dexamethasone or Solumedrol     Comments:    Other Comments: Muriel Chauhan is a 32 year old female Patient's last menstrual period was 11/23/2022 (exact date).  Had u/s today at [facility] and noted to have enlarging left ovarian cyst.  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  States pain is about a 6 out of 10 at today's visit     11/22 2 cm simple left ovarian  cyst  2/23 7.8 cm complex left ovarian cyst  Today 15 x 14 cm complex left ovarian cyst, some smaller cystic structures at periphery and some internal heterogenic              Christopher J. Behrens, MD

## 2023-04-12 NOTE — BRIEF OP NOTE
[facility]    Brief Operative Note    Pre-operative diagnosis: Left ovarian cyst [N83.202]  Post-operative diagnosis Same as pre-operative diagnosis    Procedure: Procedure(s):  LAPAROTOMY left salpingectomy left oophorectomy, with pelvic washings  Surgeon: Surgeon(s) and Role:     * Jordyn Cervantes MD - Primary     * Lanny Cooney MD - Assisting     * Padmini Harrison MD - Resident - Assisting     * Stacey Stephen MD - Resident - Assisting  Anesthesia: General   Estimated Blood Loss: 100 ml  IVF: 800 ml  UOP: 200 ml    Drains:Sousa  Specimens:   ID Type Source Tests Collected by Time Destination   1 : pelvic washing for cytology Washings Pelvis NON-GYNECOLOGIC CYTOLOGY Jordyn Cervantes MD 4/12/2023  4:16 PM    2 : left ovary and fallopian tube Tissue Ovary and Fallopian Tube, Left SURGICAL PATHOLOGY EXAM Jordyn Cervantes MD 4/12/2023  4:28 PM      Findings:   approximately 20 cm size, dark brown fluid filled, multiloculated cyst arising from left ovary. No normal ovarian tissue identified. bleeding from left fallopian tube. normal appearing right fallopian tube and ovary. 20 week size gravid uterus. .  Complications: None.  Implants: * No implants in log *    Padmini Harrison MD

## 2023-04-12 NOTE — PROVIDER NOTIFICATION
Called to return to bedside by PACU nurse due to maternal bradycardia requesting more intermittent auscultation.  Concern for fetal bradycardia.  Maternal heart rate normal throughout doptones. Baseline 140, no increases heard, no decreases heard.

## 2023-04-12 NOTE — ANESTHESIA CARE TRANSFER NOTE
Patient: Muriel Chauhan    Procedure: Procedure(s):  LAPAROTOMY left salpingectomy left oophorectomy, with pelvic washings       Diagnosis: Left ovarian cyst [N83.202]  Diagnosis Additional Information: No value filed.    Anesthesia Type:   General     Note:    Oropharynx: oropharynx clear of all foreign objects and spontaneously breathing  Level of Consciousness: drowsy  Oxygen Supplementation: face mask  Level of Supplemental Oxygen (L/min / FiO2): 6  Independent Airway: airway patency satisfactory and stable  Dentition: dentition unchanged  Vital Signs Stable: post-procedure vital signs reviewed and stable  Report to RN Given: handoff report given  Patient transferred to: PACU    Handoff Report: Identifed the Patient, Identified the Reponsible Provider, Reviewed the pertinent medical history, Discussed the surgical course, Reviewed Intra-OP anesthesia mangement and issues during anesthesia, Set expectations for post-procedure period and Allowed opportunity for questions and acknowledgement of understanding      Vitals:  Vitals Value Taken Time   /66 04/12/23 1723   Temp     Pulse 90 04/12/23 1730   Resp 12 04/12/23 1730   SpO2 100 % 04/12/23 1730   Vitals shown include unvalidated device data.    Electronically Signed By: KRYSTLE Saucedo CRNA  April 12, 2023  5:31 PM

## 2023-04-13 ENCOUNTER — ANESTHESIA EVENT (OUTPATIENT)
Dept: SURGERY | Facility: CLINIC | Age: 33
DRG: 819 | End: 2023-04-13
Payer: COMMERCIAL

## 2023-04-13 ENCOUNTER — TELEPHONE (OUTPATIENT)
Dept: OBGYN | Facility: CLINIC | Age: 33
End: 2023-04-13
Payer: COMMERCIAL

## 2023-04-13 ENCOUNTER — ANCILLARY PROCEDURE (OUTPATIENT)
Dept: ULTRASOUND IMAGING | Facility: CLINIC | Age: 33
End: 2023-04-13
Payer: COMMERCIAL

## 2023-04-13 ENCOUNTER — ANESTHESIA (OUTPATIENT)
Dept: SURGERY | Facility: CLINIC | Age: 33
DRG: 819 | End: 2023-04-13
Payer: COMMERCIAL

## 2023-04-13 LAB
ATRIAL RATE - MUSE: 86 BPM
DIASTOLIC BLOOD PRESSURE - MUSE: NORMAL MMHG
ERYTHROCYTE [DISTWIDTH] IN BLOOD BY AUTOMATED COUNT: 12.6 % (ref 10–15)
GLUCOSE SERPL-MCNC: 108 MG/DL (ref 70–99)
HCT VFR BLD AUTO: 28.6 % (ref 35–47)
HGB BLD-MCNC: 9.3 G/DL (ref 11.7–15.7)
HOLD SPECIMEN: NORMAL
INTERPRETATION ECG - MUSE: NORMAL
MAGNESIUM SERPL-MCNC: 2.3 MG/DL (ref 1.7–2.3)
MAGNESIUM SERPL-MCNC: 2.8 MG/DL (ref 1.7–2.3)
MCH RBC QN AUTO: 29.6 PG (ref 26.5–33)
MCHC RBC AUTO-ENTMCNC: 32.5 G/DL (ref 31.5–36.5)
MCV RBC AUTO: 91 FL (ref 78–100)
P AXIS - MUSE: 45 DEGREES
PLATELET # BLD AUTO: 258 10E3/UL (ref 150–450)
PR INTERVAL - MUSE: 106 MS
QRS DURATION - MUSE: 82 MS
QT - MUSE: 386 MS
QTC - MUSE: 461 MS
R AXIS - MUSE: 93 DEGREES
RBC # BLD AUTO: 3.14 10E6/UL (ref 3.8–5.2)
SYSTOLIC BLOOD PRESSURE - MUSE: NORMAL MMHG
T AXIS - MUSE: 52 DEGREES
VENTRICULAR RATE- MUSE: 86 BPM
WBC # BLD AUTO: 10.2 10E3/UL (ref 4–11)

## 2023-04-13 PROCEDURE — 250N000011 HC RX IP 250 OP 636: Performed by: STUDENT IN AN ORGANIZED HEALTH CARE EDUCATION/TRAINING PROGRAM

## 2023-04-13 PROCEDURE — 36415 COLL VENOUS BLD VENIPUNCTURE: CPT | Performed by: OBSTETRICS & GYNECOLOGY

## 2023-04-13 PROCEDURE — 82947 ASSAY GLUCOSE BLOOD QUANT: CPT | Performed by: OBSTETRICS & GYNECOLOGY

## 2023-04-13 PROCEDURE — 120N000002 HC R&B MED SURG/OB UMMC

## 2023-04-13 PROCEDURE — 258N000003 HC RX IP 258 OP 636: Performed by: OBSTETRICS & GYNECOLOGY

## 2023-04-13 PROCEDURE — 83735 ASSAY OF MAGNESIUM: CPT | Performed by: OBSTETRICS & GYNECOLOGY

## 2023-04-13 PROCEDURE — 250N000013 HC RX MED GY IP 250 OP 250 PS 637: Performed by: STUDENT IN AN ORGANIZED HEALTH CARE EDUCATION/TRAINING PROGRAM

## 2023-04-13 PROCEDURE — 5A0935A ASSISTANCE WITH RESPIRATORY VENTILATION, LESS THAN 24 CONSECUTIVE HOURS, HIGH NASAL FLOW/VELOCITY: ICD-10-PCS | Performed by: OBSTETRICS & GYNECOLOGY

## 2023-04-13 PROCEDURE — 999N000141 HC STATISTIC PRE-PROCEDURE NURSING ASSESSMENT

## 2023-04-13 PROCEDURE — 85027 COMPLETE CBC AUTOMATED: CPT | Performed by: STUDENT IN AN ORGANIZED HEALTH CARE EDUCATION/TRAINING PROGRAM

## 2023-04-13 RX ORDER — CHLORAL HYDRATE 500 MG
2 CAPSULE ORAL DAILY
COMMUNITY

## 2023-04-13 RX ORDER — DEXAMETHASONE SODIUM PHOSPHATE 10 MG/ML
INJECTION, SOLUTION INTRAMUSCULAR; INTRAVENOUS
Status: COMPLETED | OUTPATIENT
Start: 2023-04-13 | End: 2023-04-13

## 2023-04-13 RX ORDER — SODIUM CHLORIDE, SODIUM LACTATE, POTASSIUM CHLORIDE, CALCIUM CHLORIDE 600; 310; 30; 20 MG/100ML; MG/100ML; MG/100ML; MG/100ML
INJECTION, SOLUTION INTRAVENOUS
Status: DISPENSED
Start: 2023-04-13 | End: 2023-04-14

## 2023-04-13 RX ORDER — BUPIVACAINE HYDROCHLORIDE 2.5 MG/ML
INJECTION, SOLUTION EPIDURAL; INFILTRATION; INTRACAUDAL
Status: COMPLETED | OUTPATIENT
Start: 2023-04-13 | End: 2023-04-13

## 2023-04-13 RX ORDER — DEXMEDETOMIDINE HYDROCHLORIDE 4 UG/ML
INJECTION, SOLUTION INTRAVENOUS
Status: COMPLETED | OUTPATIENT
Start: 2023-04-13 | End: 2023-04-13

## 2023-04-13 RX ADMIN — IBUPROFEN 800 MG: 600 TABLET ORAL at 11:16

## 2023-04-13 RX ADMIN — FAMOTIDINE 10 MG: 10 TABLET ORAL at 00:22

## 2023-04-13 RX ADMIN — FAMOTIDINE 10 MG: 10 TABLET ORAL at 19:49

## 2023-04-13 RX ADMIN — BUPIVACAINE HYDROCHLORIDE 60 ML: 2.5 INJECTION, SOLUTION EPIDURAL; INFILTRATION; INTRACAUDAL at 12:45

## 2023-04-13 RX ADMIN — PRENATAL VITAMINS-IRON FUMARATE 27 MG IRON-FOLIC ACID 0.8 MG TABLET 1 TABLET: at 08:18

## 2023-04-13 RX ADMIN — IBUPROFEN 800 MG: 600 TABLET ORAL at 16:45

## 2023-04-13 RX ADMIN — ACETAMINOPHEN 975 MG: 325 TABLET ORAL at 03:28

## 2023-04-13 RX ADMIN — KETOROLAC TROMETHAMINE 15 MG: 15 INJECTION, SOLUTION INTRAMUSCULAR; INTRAVENOUS at 05:57

## 2023-04-13 RX ADMIN — SENNOSIDES AND DOCUSATE SODIUM 1 TABLET: 50; 8.6 TABLET ORAL at 19:49

## 2023-04-13 RX ADMIN — ACETAMINOPHEN 975 MG: 325 TABLET ORAL at 22:19

## 2023-04-13 RX ADMIN — FAMOTIDINE 10 MG: 10 TABLET ORAL at 08:18

## 2023-04-13 RX ADMIN — SODIUM CHLORIDE, POTASSIUM CHLORIDE, SODIUM LACTATE AND CALCIUM CHLORIDE 1000 ML: 600; 310; 30; 20 INJECTION, SOLUTION INTRAVENOUS at 00:23

## 2023-04-13 RX ADMIN — SENNOSIDES AND DOCUSATE SODIUM 1 TABLET: 50; 8.6 TABLET ORAL at 08:19

## 2023-04-13 RX ADMIN — DEXMEDETOMIDINE HYDROCHLORIDE 40 MCG: 4 INJECTION, SOLUTION INTRAVENOUS at 12:45

## 2023-04-13 RX ADMIN — DEXAMETHASONE SODIUM PHOSPHATE 2 MG: 10 INJECTION, SOLUTION INTRAMUSCULAR; INTRAVENOUS at 12:45

## 2023-04-13 RX ADMIN — ACETAMINOPHEN 975 MG: 325 TABLET ORAL at 09:48

## 2023-04-13 ASSESSMENT — ACTIVITIES OF DAILY LIVING (ADL)
ADLS_ACUITY_SCORE: 18
ADLS_ACUITY_SCORE: 19
ADLS_ACUITY_SCORE: 18
ADLS_ACUITY_SCORE: 18
ADLS_ACUITY_SCORE: 19
ADLS_ACUITY_SCORE: 18
ADLS_ACUITY_SCORE: 18
ADLS_ACUITY_SCORE: 19

## 2023-04-13 NOTE — ANESTHESIA PROCEDURE NOTES
TAP Procedure Note    Pre-Procedure   Staff -        Anesthesiologist:  Barrera Giron MD       Resident/Fellow: Ludwig Adame MD       Performed By: resident       Location: pre-op       Procedure Start/Stop Times: 4/13/2023 12:45 PM and 4/13/2023 12:50 PM       Pre-Anesthestic Checklist: patient identified, IV checked, site marked, risks and benefits discussed, informed consent, monitors and equipment checked, pre-op evaluation, at physician/surgeon's request and post-op pain management  Timeout:       Correct Patient: Yes        Correct Procedure: Yes        Correct Site: Yes        Correct Position: Yes        Correct Laterality: Yes        Site Marked: Yes  Procedure Documentation  Procedure: TAP       Laterality: bilateral       Patient Position: supine       Patient Prep/Sterile Barriers: sterile gloves, mask       Skin prep: Chloraprep       Local skin infiltrated with 7 mL of 2% lidocaine.        Needle Type: short bevel       Needle Gauge: 21.        Needle Length (millimeters): 110        Ultrasound guided       1. Ultrasound was used to identify targeted nerve, plexus, vascular marker, or fascial plane and place a needle adjacent to it in real-time.       2. Ultrasound was used to visualize the spread of anesthetic in close proximity to the above referenced structure.       3. A permanent image is entered into the patient's record.    Assessment/Narrative         The placement was negative for: blood aspirated, painful injection and site bleeding       Paresthesias: No.       Bolus given via needle. no blood aspirated via catheter.        Secured via.        Insertion/Infusion Method: Single Shot       Complications: none       Injection made incrementally with aspirations every 5 mL.    Medication(s) Administered   Bupivacaine 0.25% PF (Infiltration) - Infiltration   60 mL - 4/13/2023 12:45:00 PM  Dexmedetomidine 4 mcg/mL (Perineural) - Perineural   40 mcg - 4/13/2023 12:45:00  "PM  Dexamethasone 10 mg/mL PF (Perineural) - Perineural   2 mg - 4/13/2023 12:45:00 PM  Medication Administration Time: 4/13/2023 12:45 PM      FOR [facility] ([facility]) ONLY:   Pain Team Contact information: please page the Pain Team Via [employer]. Search \"Pain\". During daytime hours, please page the attending first. At night please page the resident first.      "

## 2023-04-13 NOTE — OR NURSING
[facility]  Patient returned to 5th floor with transport post TAP block.  Patient's VSS were stable for 30 minutes post TAP block.

## 2023-04-13 NOTE — OR NURSING
1950 OB resident aware and MARGOT Barillas aware of pt's most recent lab results. OB resident states that she will put in orders to address and MDA states that pt can be transferred to 5th floor.

## 2023-04-13 NOTE — OR NURSING
Patient brought down to pre-op by transport.  Report taken from RN on 5 Med Surg.  Consent to be signed in pre-op.  Patient is not NPO as patient is not having sedation for procedure today.  Patient does have IV access still that is patent.  No other changes since last documented assessment by floor RN.  Will monitor patient's VS during TAP block and then patient may return to floor once MDA ok with her returning to floor post TAP block.

## 2023-04-13 NOTE — PROGRESS NOTES
Pt doing better with pain. Would like to see social work and is considering moving to [place] during this pregnancy.   Araceli Cervantes MD

## 2023-04-13 NOTE — TELEPHONE ENCOUNTER
PAPERWORK REQUEST    Form received on: 4/13  How was form received: In Person  Preferred Provider: Jordyn Cervantes MD  Type of form: FMLA  Date needed by: ASAP  What to do with form once completed: Dr. Cervantes took the paperwork from the patient while she was still in the hospital.  Where was form placed?: Dr. Cervantes brought completed forms back to patient at the hospital  Has an GLEN been signed? Not Applicable

## 2023-04-13 NOTE — PLAN OF CARE
"Goal Outcome Evaluation:       BP 91/57   Pulse 84   Temp 98.1  F (36.7  C) (Oral)   Resp 20   Ht 1.626 m (5' 4\")   Wt 87.7 kg (193 lb 5.5 oz)   LMP 11/23/2022 (Exact Date)   SpO2 98%   BMI 33.19 kg/m      A&Ox4. CMS intact. Denies numbness/tingling, nausea,vomiting, SOB, and chest pain.  Up w/ walker and gait belt tolerating ambulation well.   Voiding adequate amounts. Sousa removed.   No bowel movement on shift  Pt. Had chicken noodle soup when arrived to the floor and tolerated well.   Dressings on abdominal incision liquid bandage clean dry and intact.  Pain at lower abdominal incision 5/10 Managed W/ketorlac, tyl, IBU, and Ice pack  Continue to monitor BP trending low last /90  Pt. Complained of cramping in abdomen MD notified and administered LR bolus. Pt. Stated cramps diminished   Pt. Spouse contacted per request for update no answer and voicemail not set up.  RN managed magnesium/replace re check ordered for 0600  * PIV L/hand running  mL/hr  Will continue with POC and monitor.                 "

## 2023-04-13 NOTE — PHARMACY-ADMISSION MEDICATION HISTORY
Pharmacist Admission Medication History    Admission medication history is complete. The information provided in this note is only as accurate as the sources available at the time of the update.    Medication reconciliation/reorder completed by provider prior to medication history? No    Information Source(s): Patient via in-person, Rx fill history     Changes made to PTA medication list:    Added: fish oil capsules     Deleted: None    Changed: None    Allergies reviewed with patient and updates made in EHR: yes    Medication History Completed By: Dell Berry RPH 4/13/2023 3:40 PM    PTA Med List   Medication Sig Last Dose     fish oil-omega-3 fatty acids 1000 MG capsule Take 2 g by mouth daily 4/11/2023     Prenatal Vit-Fe Fumarate-FA (PRENATAL MULTIVITAMIN W/IRON) 27-0.8 MG tablet Take 1 tablet by mouth daily 4/11/2023 at 0900     valACYclovir (VALTREX) 500 MG tablet Take 1 tablet (500 mg) by mouth 2 times daily 4/11/2023 at 1700     vitamin C (ASCORBIC ACID) 250 MG tablet Take 500 mg by mouth daily 4/11/2023 at 0900     Vitamin D, Cholecalciferol, 25 MCG (1000 UT) CAPS Take 2,000 Units by mouth daily 4/11/2023 at 0900

## 2023-04-13 NOTE — PROGRESS NOTES
Brief OBGYN Progress Note    Patient feeling better in terms of pain control since TAPs block. Mostly taken in liquids as she is concerned about her bowel function. No nausea or vomiting. Ambulating to the bathroom without difficulty. No further questions or concerns at this time. Hoping to speak with SW today or tomorrow.    Padmini Harrison MD  Obstetrics and Gynecology, PGY4  04/13/23 2:10 PM

## 2023-04-13 NOTE — PROGRESS NOTES
"Gynecology Oncology Progress Note  23    HD#2 /POD#1 s/p exploratory laparotomy, left oophorectomy, pelvic washings     24 hour events: Increased pain overnight, pt avoiding narcotic pain medications, took tylenol. Agreed to take Toradol at 557 am.    Subjective: Patient is doing well this morning.  Incisional pain is main issue for patient, does not want to use narcotics but pain moderatelly controlled with lidocaine patches, acetaminophen, ibuprofen and toradol. Discussed surgery outcomes and patient was tearful learning her left ovary was removed. Tolerating PO, not passing flatus, voiding spontaneously, and ambulating without issues. Denies fevers, night sweats, SOB, nausea/vomiting, fevers/chills.    Objective:   BP 90/52   Pulse 84   Temp 98.1  F (36.7  C) (Oral)   Resp 20   Ht 1.626 m (5' 4\")   Wt 87.7 kg (193 lb 5.5 oz)   LMP 2022 (Exact Date)   SpO2 98%   BMI 33.19 kg/m      General: NAD, sitting upright in bed  Abdomen: soft, no rebound or guarding. Non-distended, tender around incision, incision clean, no drainage, well approximated  Extremities: warm, well-perfused, nontender, SCDs in place    Lines/Drains: PIV    Recent labs:  CBC RESULTS: Recent Labs   Lab Test 23  0329   WBC 10.2   RBC 3.14*   HGB 9.3*   HCT 28.6*   MCV 91   MCH 29.6   MCHC 32.5   RDW 12.6        Assessment/Plan:   Muriel Chauhan is a 32 year old  female who is POD#1 s/p XL, left oophorectomy, pelvic washings. Recovering as anticipated. Working on discharge goals.   Routine post-operative cares  - FEN: Tolerating regular diet, will discontinue IV fluids  - Pain: Toradol/ibuprofen, tylenol, oxycodone prn  - Heme: Hgb: 10.3> > 9.5. Will discharge home with PO iron.  - : S/p anguiano, voiding spontaneously  - GI: Tolerating PO intake. Continue anti-emetics and stool softeners as needed.  - PPX: SCDs while in bed, encourage ambulation  - Dispo: Discharge to home on POD#1-2, likely today if " pain under control and passing flatus 4/13    Tigist Adame, MS3  [facility]     Addendum:  Patient seen, discussed and examined with the medical student. I agree with the documentation above as edited by me. Upset upon learning that her left ovary was removed in the setting of very large ovarian cyst, no appreciable normal tissue and bleeding. Recovering as anticipated otherwise.    Discussed with Dr. Cervantes.    Pamdini Harrison MD  Obstetrics and Gynecology, PGY4  04/13/23 7:38 AM      Attestation:   This patient was seen and evaluated by me, separately from the house staff team. I have reviewed the note/plan above and agree.     Doing okay but pain has been bad. Discussed still could do TAPS block that was discussed yesterday and she had declined. She is willing to do that today if doesn't affect baby.  Surgical findings discussed and that no normal ovarian tissue remained to keep. Looked at the pictures with her and explained 1.5 L fluid that came out prior to picture to move ovary into pelvis vs incision to sternum. Discussed can still get pregnant many times in the future with right ovary. Reassured will not affect hormones right now, may have menopause up to year earlier. Appropriately tearful and await pathology.     Routine cares discussed and plan discharge home POD #2-3. I will see pt later today and bring doptone so she can hear baby again.     Jordyn Cervantes MD

## 2023-04-13 NOTE — OR NURSING
PACU to Inpatient Nursing Handoff    Patient Muriel Chauhan is a 32 year old female who speaks English.   Procedure Procedure(s):  LAPAROTOMY left salpingectomy left oophorectomy, with pelvic washings   Surgeon(s) Primary: Jordyn Cervantes MD  Assisting: Lanny Cooney MD  Resident - Assisting: Stacey Stephen MD; Padmini Harrison MD     No Known Allergies    Isolation  [unfilled]     Past Medical History   has a past medical history of Acid reflux, Herpes simplex virus (HSV) infection, PONV (postoperative nausea and vomiting), and Urinary tract infection.    Anesthesia General   Dermatome Level     Preop Meds Not applicable   Nerve block Not applicable   Intraop Meds dexamethasone (Decadron)  fentanyl (Sublimaze): 100 mcg total  ketorolac (Toradol): last given at 1711  ondansetron (Zofran): last given at 1707  phenylepherine   Local Meds No   Antibiotics cefazolin (Ancef) - last given at 1552     Pain Patient Currently in Pain: yes   PACU meds  fentanyl (Sublimaze): 50 mcg (total dose) last given at 1733   hydromorphone (Dilaudid): 1.4 mg (total dose) last given at 1825    PCA / epidural No   Capnography     Telemetry ECG Rhythm: Normal sinus rhythm   Inpatient Telemetry Monitor Ordered? No        Labs Glucose Lab Results   Component Value Date    GLC 87 04/12/2023    GLC 70 04/12/2023    GLC 99 11/08/2022       Hgb Lab Results   Component Value Date    HGB 9.5 04/12/2023       INR No results found for: INR   PACU Imaging Not applicable     Wound/Incision Incision/Surgical Site 04/12/23 Abdomen (Active)   Incision Assessment WDL 04/12/23 1852   Closure Approximated;Liquid bandage;Sutures 04/12/23 1702   Incision Drainage Amount None 04/12/23 1852   Dressing Intervention Open to air / No Dressing 04/12/23 1852   Number of days: 0      CMS        Equipment ice pack   Other LDA       IV Access Peripheral IV 04/12/23 Left Hand (Active)   Site Assessment WDL 04/12/23 1850   Line Status Infusing  04/12/23 1850   Dressing Transparent 04/12/23 1850   Dressing Status clean;dry;intact 04/12/23 1850   Dressing Intervention New dressing  04/12/23 1330   Phlebitis Scale 0-->no symptoms 04/12/23 1723   Number of days: 0      Blood Products Not applicable  mL   Intake/Output Date 04/12/23 0700 - 04/13/23 0659   Shift 6849-8577 8993-4578 8812-5240 24 Hour Total   INTAKE   I.V.  800  800   Shift Total(mL/kg)  800(9.12)  800(9.12)   OUTPUT   Blood  100  100   Shift Total(mL/kg)  100(1.14)  100(1.14)   Weight (kg) 87.7 87.7 87.7 87.7      Drains / Sousa Urethral Catheter 04/12/23 Latex;Silver coated;Straight-tip;Double-lumen 16 fr (Active)   Tube Description UTV 04/12/23 1723   Collection Container Standard 04/12/23 1852   Securement Method Securing device (Describe) 04/12/23 1852   Rationale for Continued Use /GI/GYN Pelvic Procedure 04/12/23 1852   Number of days: 0      Time of void PreOp Time of Void Prior to Procedure: 1315 (04/12/23 1318)    PostOp      Diapered? No   Bladder Scan     PO    tolerating sips     Vitals    B/P: 108/66  T: 99.1  F (37.3  C)    Temp src: Oral  P:  Pulse: 78 (04/12/23 1900)          R: 16  O2:  SpO2: 95 %    O2 Device: None (Room air) (04/12/23 1845)    Oxygen Delivery: 8 LPM (04/12/23 1723)         Family/support present cousin and his wife   Patient belongings     Patient transported on cart and air mat   DC meds/scripts (obs/outpt) Not applicable   Inpatient Pain Meds Released? Yes       Special needs/considerations 20 weeks pregnant   Tasks needing completion None       Poppy Hilario, ZAYRA  ASCOM [id number]

## 2023-04-13 NOTE — PROGRESS NOTES
Gynecology Postop check    Subjective:  Patient is resting in bed. Having a lot of pain from surgery, but trying to avoid taking pain medications. Had some cramping after surgery which improved after fluid bolus. She has not been able to sleep because of pain. Voiding spontaneously. Denies nausea/vomiting. Denies vaginal bleeding, LOF. Has not felt any fetal movement yet this pregnancy.    Objective:  Vitals:    23 2215 23 2245 23 0000 23 0145   BP:   99/57 101/54   BP Location:       Pulse:    72   Resp:    18   Temp:    99  F (37.2  C)   TempSrc:    Oral   SpO2: 97% 98% 97% 98%   Weight:       Height:           General: NAD. Resting in bed  CV: Well perfused, regula rate  Pulm: Normal respiratory effort.  Abd: Soft, mildly distended. Appropriately tender. VML well approximated, no erythema or drainage.   Ext: Trace edema    Assessment/Plan:  Muriel Chauhan is a 32 year old  female who is POD#1 s/p XL, left oophorectomy, pelvic washings.  Doing well post-operatively.    Routine post-operative cares  - FEN: Tolerating regular diet  - Pain: Toradol/ibuprofen, tylenol, oxycodone prn  - Heme: Hgb: 10.3> > 9.5.  If <10 will discharge home with PO iron.  - : S/p anguiano, voiding spontaneously  - GI: Tolerating PO intake. Continue anti-emetics and stool softeners as needed.  - PPX: SCDs while in bed, encourage ambulation  - Dispo: Discharge to home on POD#1-2    Isabel Benson MD  Obstetrics & Gynecology, PGY-3  2023 3:24 AM

## 2023-04-14 VITALS
HEIGHT: 64 IN | RESPIRATION RATE: 16 BRPM | BODY MASS INDEX: 33.01 KG/M2 | HEART RATE: 71 BPM | WEIGHT: 193.34 LBS | SYSTOLIC BLOOD PRESSURE: 101 MMHG | OXYGEN SATURATION: 98 % | TEMPERATURE: 98.2 F | DIASTOLIC BLOOD PRESSURE: 56 MMHG

## 2023-04-14 LAB
PATH REPORT.COMMENTS IMP SPEC: NORMAL
PATH REPORT.FINAL DX SPEC: NORMAL
PATH REPORT.GROSS SPEC: NORMAL
PATH REPORT.MICROSCOPIC SPEC OTHER STN: NORMAL
PATH REPORT.RELEVANT HX SPEC: NORMAL

## 2023-04-14 PROCEDURE — 250N000013 HC RX MED GY IP 250 OP 250 PS 637: Performed by: STUDENT IN AN ORGANIZED HEALTH CARE EDUCATION/TRAINING PROGRAM

## 2023-04-14 PROCEDURE — 258N000003 HC RX IP 258 OP 636: Performed by: OBSTETRICS & GYNECOLOGY

## 2023-04-14 PROCEDURE — 93005 ELECTROCARDIOGRAM TRACING: CPT

## 2023-04-14 PROCEDURE — 250N000011 HC RX IP 250 OP 636: Performed by: OBSTETRICS & GYNECOLOGY

## 2023-04-14 PROCEDURE — 93010 ELECTROCARDIOGRAM REPORT: CPT | Performed by: INTERNAL MEDICINE

## 2023-04-14 RX ORDER — IBUPROFEN 800 MG/1
800 TABLET, FILM COATED ORAL EVERY 6 HOURS PRN
Qty: 20 TABLET | Refills: 0 | Status: SHIPPED | OUTPATIENT
Start: 2023-04-14

## 2023-04-14 RX ORDER — ACETAMINOPHEN 325 MG/1
325-650 TABLET ORAL EVERY 6 HOURS PRN
Qty: 40 TABLET | Refills: 0 | Status: SHIPPED | OUTPATIENT
Start: 2023-04-14

## 2023-04-14 RX ORDER — METHYLPREDNISOLONE SODIUM SUCCINATE 125 MG/2ML
125 INJECTION, POWDER, LYOPHILIZED, FOR SOLUTION INTRAMUSCULAR; INTRAVENOUS
Status: DISCONTINUED | OUTPATIENT
Start: 2023-04-14 | End: 2023-04-14 | Stop reason: HOSPADM

## 2023-04-14 RX ORDER — VALACYCLOVIR HYDROCHLORIDE 500 MG/1
500 TABLET, FILM COATED ORAL 2 TIMES DAILY
Qty: 180 TABLET | Refills: 1 | Status: SHIPPED | OUTPATIENT
Start: 2023-04-14

## 2023-04-14 RX ORDER — SENNA AND DOCUSATE SODIUM 50; 8.6 MG/1; MG/1
1 TABLET, FILM COATED ORAL AT BEDTIME
Qty: 40 TABLET | Refills: 0 | Status: SHIPPED | OUTPATIENT
Start: 2023-04-14

## 2023-04-14 RX ORDER — DIPHENHYDRAMINE HYDROCHLORIDE 50 MG/ML
50 INJECTION INTRAMUSCULAR; INTRAVENOUS
Status: DISCONTINUED | OUTPATIENT
Start: 2023-04-14 | End: 2023-04-14 | Stop reason: HOSPADM

## 2023-04-14 RX ADMIN — SENNOSIDES AND DOCUSATE SODIUM 1 TABLET: 50; 8.6 TABLET ORAL at 08:22

## 2023-04-14 RX ADMIN — IBUPROFEN 800 MG: 600 TABLET ORAL at 10:57

## 2023-04-14 RX ADMIN — PRENATAL VITAMINS-IRON FUMARATE 27 MG IRON-FOLIC ACID 0.8 MG TABLET 1 TABLET: at 08:22

## 2023-04-14 RX ADMIN — IRON SUCROSE 300 MG: 20 INJECTION, SOLUTION INTRAVENOUS at 09:38

## 2023-04-14 RX ADMIN — FAMOTIDINE 10 MG: 10 TABLET ORAL at 08:21

## 2023-04-14 RX ADMIN — ACETAMINOPHEN 975 MG: 325 TABLET ORAL at 10:58

## 2023-04-14 RX ADMIN — ACETAMINOPHEN 975 MG: 325 TABLET ORAL at 05:25

## 2023-04-14 ASSESSMENT — ACTIVITIES OF DAILY LIVING (ADL)
ADLS_ACUITY_SCORE: 18

## 2023-04-14 NOTE — CONSULTS
Writer was notified that patient has questions regarding her insurance. Patient explained that she hasn't been able to work since 3/5/23. HR at her job informed her that she would have to pay a certain amount of money to be able to keep her insurance through them. Patient stated that she is not able to pay. She is wondering how to get different insurance. Left VM for Financial Counseling to discuss patient's options.     3:16 PM  Spoke to Financial Counseling and they have an appointment to call patient on Monday at 10:30am to discuss potential Medical Assistance (patient does not need to be in the hospital for appointment). He is also planning to call her today to see if she has any questions or concerns prior to that.       MARY Esparza RNCC  RN Care Coordinator   Office: [phone]   Pager: [phone]

## 2023-04-14 NOTE — PLAN OF CARE
"Goal Outcome Evaluation:         /67   Pulse 70   Temp 98.7  F (37.1  C) (Oral)   Resp 18   Ht 1.626 m (5' 4\")   Wt 87.7 kg (193 lb 5.5 oz)   LMP 11/23/2022 (Exact Date)   SpO2 98%   BMI 33.19 kg/m      A&Ox4. CMS intact. Denies numbness/tingling, nausea,vomiting, and chest pain.     Pt. Complained of SOB when lying L/R side and improves when supine. Minimal SOB when sitting and ambulating - OBGYN paged and informed writer she would be up to assess pt. No provider ever came to assess pt, pt still reporting SOB. SpO2 in high 90s on RA. Provider re-paged and OBGYN assessed patient in AM.     Pt. Ambulating independently and tolerating well.   Pt. Rating pain 3/10 managed with tyl and refused scheduled IBU.    No BM on shift. Passing gas.    Will continue with POC.                "

## 2023-04-14 NOTE — PROGRESS NOTES
"Gynecology Progress Note  23    HD#3 /POD# 2 s/p s/p exploratory laparotomy, left oophorectomy, pelvic washings       24 hour events: Patient reported SOB around midnight and nursing reports O2 saturation in upper 80s. When new pulse ox applied this normalized.    Subjective: Patient is doing fine this morning. Reports that she did not sleep well last night because she had some SOB or epigastric pain with laying on her right or left side. No pain or SOB if laying flat, sitting up or walking. Reports she feels like she has to take a deep breath to breath and has pain with deep breath.     Received nasal canula overnight for comfort. Incisional pain is well controlled with TAPS procedure. Tolerating PO, passing flatus, no BM yet, voiding spontaneously, and ambulating without issues. Denies nausea/vomiting, fevers/chills, dizziness.    Objective:   /67   Pulse 70   Temp 98.7  F (37.1  C) (Oral)   Resp 18   Ht 1.626 m (5' 4\")   Wt 87.7 kg (193 lb 5.5 oz)   LMP 2022 (Exact Date)   SpO2 98%   BMI 33.19 kg/m      General: NAD, sitting at edge of bed  CV: RRR, no murmurs appreciated  Resp: CTAB anteriorly and posteriorly, no wheezes, on room air  Abdomen: soft, non-distended, TTP at epigastric/xiphoid region  Incision: clean, well-approximated, no drainage  Extremities: TTP at right trapezius, warm, well-perfused, nontender, no edema    Assessment/Plan:   Muriel Chauhan is a 32 year old  female who is POD#1 s/p XL, left oophorectomy, pelvic washings. Recovering as anticipated, howevemr has some positional shortness of breath and epigastric/xiphoid tenderness. Most likely musculoskeletal etiology given that the pain is reproducible and positional in nature. There is a higher risk or PE/DVT due to pregnancy and recent surgery, however, vital signs have remained stable, on room air w/ appropriate O2 saturation, no leg swelling or pain. Unlikely cardiac etiology as no significant medical " history and again pain being reproducible. Working on discharge goals.     Lower sternal/epigastric pain  - EKG NSR     Routine post-operative cares  - FEN: Regular diet  - Pain: Toradol/ibuprofen, tylenol, oxycodone prn  - Heme: Hgb: 10.3> > 9.5. Will discharge home with PO iron.  - : S/p anguiano, voiding spontaneously  - GI: Tolerating PO intake. Continue anti-emetics and stool softeners as needed.  - PPX: SCDs while in bed, encourage ambulation  - Dispo: Discharge to home on POD#2-3     Tigist Adame, MS3  [facility]     Addendum:  Patient seen and examined with the medical student. I agree with the documentation above as edited by me. Positional SOB. Reproducible ttp at xiphoid. Overall low concern for cardiac or pulmonary etiology, however will obtain EKG and continue to monitor closely. Otherwise meeting goals for discharge.    Discussed with Dr. Cervantes.    Padmini Harrison MD  Obstetrics and Gynecology, PGY4  04/14/23 7:43 AM      Attestation:   This patient was seen and evaluated by me, separately from the house staff team. I have reviewed the note/plan above and agree.     Doing well overall. States pain is less now than before, we did EKG this am. Has been able to get up and ambulate independently and pain controlled with tylenol/ibuprofen. Discussed home later today vs tomorrow. With her chronic anemia, will do one dose of IV iron before taking out her IV. I will plan to see back in clinic in 2 weeks for postop check and then can continue routine pregnancy care with CNM group.    Jordyn Cervantes MD

## 2023-04-14 NOTE — PLAN OF CARE
Patient is alert and Ox4, VSS except for soft BP, on RA.     Abdominal pain is improving after the TAPs Block.   Denied chest pain, SOB, nausea,     Abd incision abdiaziz and clean, VERENA.   Denied cramping, lower back pain, vaginal discharge.     Cont of Bladder, LBM 4/12.   Tolerated well with regular diet,     IND with walker, steady gait,   Cont of POC.

## 2023-04-14 NOTE — DISCHARGE SUMMARY
Gynecology Discharge Summary    Muriel Chauhan    YOB: 1990  MRN# [id number]   Age: 32 year old         Date of Admission:  2023  Date of Discharge:  2023  Admitting Physician:  Jordyn Cervantes MD  Discharge Physician:  Mari Estevez MD  Discharging Service:  Gynecology     Primary Provider: Macey Gregg          Admission Diagnoses:     Left ovarian cyst  IUP at 20w0d         Discharge Diagnosis:     IUP at 20w2d       Discharge Disposition:   Stable, to home         Procedures:   Left salpingectomy left oophorectomy, with pelvic washings  TAPs block          Medications Prior to Admission:   Prenatal vitamin          Discharge Medications:        Review of your medicines      START taking      Dose / Directions   acetaminophen 325 MG tablet  Commonly known as: TYLENOL      Dose: 325-650 mg  Take 1-2 tablets (325-650 mg) by mouth every 6 hours as needed for mild pain  Quantity: 40 tablet  Refills: 0     ferrous sulfate 142 (45 Fe) MG CR tablet  Commonly known as: SLO-FE  Used for: Other iron deficiency anemia      Dose: 142 mg  Take 1 tablet (142 mg) by mouth every other day  Quantity: 90 tablet  Refills: 0     ibuprofen 800 MG tablet  Commonly known as: ADVIL/MOTRIN      Dose: 800 mg  Take 1 tablet (800 mg) by mouth every 6 hours as needed for pain For next 2 weeks as needed  Quantity: 20 tablet  Refills: 0     SENNA-docusate sodium 8.6-50 MG tablet  Commonly known as: SENNA S      Dose: 1 tablet  Take 1 tablet by mouth At Bedtime  Quantity: 40 tablet  Refills: 0        CONTINUE these medicines which have NOT CHANGED      Dose / Directions   fish oil-omega-3 fatty acids 1000 MG capsule      Dose: 2 g  Take 2 g by mouth daily  Refills: 0     prenatal multivitamin w/iron 27-0.8 MG tablet  Used for: Family planning      Dose: 1 tablet  Take 1 tablet by mouth daily  Quantity: 90 tablet  Refills: 3     valACYclovir 500 MG tablet  Commonly known as: VALTREX  Used  for: Genital herpes simplex, unspecified site      Dose: 500 mg  Take 1 tablet (500 mg) by mouth 2 times daily  Quantity: 180 tablet  Refills: 1     vitamin C 250 MG tablet  Commonly known as: ASCORBIC ACID      Dose: 500 mg  Take 500 mg by mouth daily  Refills: 0     Vitamin D (Cholecalciferol) 25 MCG (1000 UT) Caps      Dose: 2,000 Units  Take 2,000 Units by mouth daily  Refills: 0           Where to get your medicines      These medications were sent to [facility] [place], [place] - [address]  [address] [facility] [address], [facility] [address]    Phone: [phone]     acetaminophen 325 MG tablet    ferrous sulfate 142 (45 Fe) MG CR tablet    ibuprofen 800 MG tablet    SENNA-docusate sodium 8.6-50 MG tablet    valACYclovir 500 MG tablet            Consultations:   Anesthesiology         Brief History of Illness:   Muriel Chauhan is a 32 year old  female 20w2d with a left 2.5 cm ovarian cyst seen on an ultrasound scan in November, repeat scan 3/29/23 cyst grew to 15 cm x 14 cm x 10 cm. Patient had intermittent symptoms from cyst including pain and feeling of fullness and pressure when on her left side. Discussed optimal time for surgery is in the second trimester and would like to accomplish surgery prior to viability, 24 weeks.  Discussed open procedure given size of ovarian cyst and where uterus is. Patient was agreeable with plan.      Her procedure was uncomplicated with an EBL of 100 ml. Please see operative note for details.          Hospital Course:   Pain management was main concern post op, patient did not want narcotics and originally declined TAP procedure but then agreed on , pain now well controlled. Late evening of  patient did complain of some SOB and epigastric/xiphoid pain while laying on left or right side, no pain or SOB with sitting, standing or walking. Epigastric/xiphoid pain reproducible and likely musculoskeletal. EKG showed normal sinus rhythm.     On  POD#2 the patient was meeting all discharge goals. She met with SW prior to discharge.    DC Hemoglobin was stable at 9.3          Discharge Instructions and Follow-Up:   Discharge diet: Regular   Discharge activity: Lifting restricted to 15 pounds  No driving for 2 week(s)   Discharge follow-up: Follow up in 2-4 weeks with Dr. Cervantes   Other instructions: None      Padmini Harrison MD  Obstetrics and Gynecology, PGY4  04/14/23. 5:00 PM.      Physician Attestation   My partner Jordyn Cervantes MD saw and evaluated this patient prior to discharge. After shift change, I discussed the patient with the resident/fellow and agree with plan of care as documented in the note.      I personally reviewed vital signs, medications and labs.    I personally spent 10 minutes on discharge activities.    Mari Estevez MD  Date of Service (when I saw the patient): 04/14/23

## 2023-04-14 NOTE — PLAN OF CARE
"BP 93/55 (BP Location: Right arm)   Pulse 66   Temp 98.5  F (36.9  C) (Oral)   Resp 15   Ht 1.626 m (5' 4\")   Wt 87.7 kg (193 lb 5.5 oz)   LMP 11/23/2022 (Exact Date)   SpO2 98%   BMI 33.19 kg/m      A&Ox4. Room air, denies SOB or CP. SBA with walker, steady gait. Iron infusion completed this afternoon. Regular diet, tolerating well. Pain in abdomen managed with scheduled tylenol and ibuprofen. Surgical incision site VERENA. Pt wanted to speak with SW regarding insurance options -- per CC note, pt has an appointment on Monday with financial counseling department to review options. Continue POC.    Pt left unit at 1815 via wheelchair. All discharge paperwork and medications were reviewed with patient, and all discharge medications were provided to patient before she left the unit. She is discharging to her home and is being driven home by her .  "

## 2023-04-15 LAB
ATRIAL RATE - MUSE: 66 BPM
DIASTOLIC BLOOD PRESSURE - MUSE: NORMAL MMHG
INTERPRETATION ECG - MUSE: NORMAL
P AXIS - MUSE: 8 DEGREES
PR INTERVAL - MUSE: 112 MS
QRS DURATION - MUSE: 78 MS
QT - MUSE: 390 MS
QTC - MUSE: 408 MS
R AXIS - MUSE: 64 DEGREES
SYSTOLIC BLOOD PRESSURE - MUSE: NORMAL MMHG
T AXIS - MUSE: 33 DEGREES
VENTRICULAR RATE- MUSE: 66 BPM

## 2023-04-17 ENCOUNTER — PATIENT OUTREACH (OUTPATIENT)
Dept: ONCOLOGY | Facility: CLINIC | Age: 33
End: 2023-04-17
Payer: COMMERCIAL

## 2023-04-17 DIAGNOSIS — C56.2 MALIGNANT NEOPLASM OF OVARY, LEFT (H): Primary | ICD-10-CM

## 2023-04-17 LAB
PATH REPORT.COMMENTS IMP SPEC: ABNORMAL
PATH REPORT.COMMENTS IMP SPEC: YES
PATH REPORT.FINAL DX SPEC: ABNORMAL
PATH REPORT.GROSS SPEC: ABNORMAL
PATH REPORT.MICROSCOPIC SPEC OTHER STN: ABNORMAL
PATH REPORT.RELEVANT HX SPEC: ABNORMAL
PATHOLOGY SYNOPTIC REPORT: ABNORMAL
PHOTO IMAGE: ABNORMAL

## 2023-04-17 NOTE — PROGRESS NOTES
New Patient Hematology / Oncology Nurse Navigator Note     Referral Date: 4/17/23    Referring provider:   Jordyn Cervantes MD   [address] [facility] [address]   Phone: [phone]   Fax: [phone]       Referring Clinic/Organization: [facility]     Referred to: GynOnc    Requested provider (if applicable): First available - did not specify     Evaluation for : follow up plan for this stage 1C ovarian cancer after delivery (pt is pregnant)     Clinical History (per Nurse review of records provided):      5/12/23 [facility] US schedule, pending -- BOOKMARKED     3/29/23 US:  IMPRESSION  ---------------------------------------------------------------------------------------------------------  1. Smith intrauterine pregnancy at 18w0d gestational age by LMP c/w 13 week US here for evaluation of fetal anatomy.  2. No fetal anomalies commonly detected by ultrasound or soft markers of aneuploidy were identified in the detailed fetal anatomic survey within the limits of prenatal  ultrasound.  3. Growth parameters and estimated fetal weight were consistent with established dates.  4. The amniotic fluid volume appeared normal.  5. On transabdominal imaging the cervix appears long and closed.  6. There is a large primarily cystic left adnexal mass, measuring 150 x 140 x 10 mm. It does have some smaller cystic structures on the periphery and some internal  heterogeneity near the periphery of the central cyst as well. Blood flow to the cyst is noted. It is tender to palpation during the ultrasound. 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  Pt confirms they are aware of the referral and ready to schedule. [facility] is preferred location.  Provided contact information if future questions arise.     -Transferred pt to NPS line [phone] to schedule appt per scheduling instructions.        Jillian Hodgson, BSN, RN, PHN, OCN  Hematology/Oncology Nurse Navigator  [facility]  [phone]

## 2023-04-17 NOTE — ANESTHESIA POSTPROCEDURE EVALUATION
Patient: Muriel Chauhan    Procedure: Procedure(s):  LAPAROTOMY left salpingectomy left oophorectomy, with pelvic washings       Anesthesia Type:  General    Note:  Disposition: Outpatient   Postop Pain Control: Uneventful            Sign Out: Well controlled pain   PONV: No   Neuro/Psych: Uneventful            Sign Out: Acceptable/Baseline neuro status   Airway/Respiratory: Uneventful            Sign Out: Acceptable/Baseline resp. status   CV/Hemodynamics: Uneventful            Sign Out: Acceptable CV status   Other NRE: NONE   DID A NON-ROUTINE EVENT OCCUR? No           Last vitals:  Vitals Value Taken Time   /46 04/12/23 2000   Temp 37.2  C (99  F) 04/12/23 2000   Pulse 89 04/12/23 2000   Resp 16 04/12/23 2000   SpO2 97 % 04/12/23 2000       Electronically Signed By: Christopher J. Behrens, MD  April 17, 2023  9:40 AM

## 2023-04-18 ENCOUNTER — TELEPHONE (OUTPATIENT)
Dept: OBGYN | Facility: CLINIC | Age: 33
End: 2023-04-18
Payer: COMMERCIAL

## 2023-04-18 NOTE — CONFIDENTIAL NOTE
Showering today noticed a small spot of bleeding on towel from incision.  No redness, little bit swelling on left incision, denies pus like drainage, denies fevers/chills.  Discuss some drainage from the incision can be expected.  Advised if she develops any redness, pus-like drainage, fevers, or the bleeding worsens to call us back.    Herminia Joe RN

## 2023-04-18 NOTE — TELEPHONE ENCOUNTER
PT wanted to let the nurses know that she saw a little bit of blood from her incision this morning.

## 2023-04-20 DIAGNOSIS — O34.82 OVARIAN CYST AFFECTING PREGNANCY IN SECOND TRIMESTER, ANTEPARTUM: Primary | ICD-10-CM

## 2023-04-20 DIAGNOSIS — N83.209 OVARIAN CYST AFFECTING PREGNANCY IN SECOND TRIMESTER, ANTEPARTUM: Primary | ICD-10-CM

## 2023-04-21 ENCOUNTER — PRE VISIT (OUTPATIENT)
Dept: ONCOLOGY | Facility: CLINIC | Age: 33
End: 2023-04-21
Payer: COMMERCIAL

## 2023-04-21 ENCOUNTER — TUMOR CONFERENCE (OUTPATIENT)
Dept: ONCOLOGY | Facility: CLINIC | Age: 33
End: 2023-04-21
Payer: COMMERCIAL

## 2023-04-21 NOTE — TUMOR CONFERENCE
Gyn Onc Tumor Board Note    Date of Tumor Board Presentation: 4/20/23  Patient: Muriel Chauhan   MRN: [id number]  Age: 32 year old  Gyn Onc Ignacio Chiu MD  Disciplines Present: Gynecologic Oncology, Pathology and Radiology    Oncologic Information  Cancer Type: Ovary  Stage: At least 1C, unstaged    Clinical Trial Discussion: No       Consensus/Management Options:  Expedite MFM and Gyn Onc consults. Recommendation of 3 cycles of BEP per NCCN guidelines and abdominal imaging as patient is unstaged. Consideration of starting with abdominal MRI as first line of imaging in setting of pregnancy, if any abnormal findings then proceed with CT C/A/P.       This abstract and interpretation of the conversation at tumor board has been completed by Murtaza Hodges MD. These are the general recommendations/discussion provided at tumor board conference. The definitive recommendation/discussion will be made by the primary health care team and patient after full discussion as appropriate.

## 2023-04-24 ENCOUNTER — ONCOLOGY VISIT (OUTPATIENT)
Dept: ONCOLOGY | Facility: CLINIC | Age: 33
End: 2023-04-24
Attending: OBSTETRICS & GYNECOLOGY
Payer: COMMERCIAL

## 2023-04-24 ENCOUNTER — LAB (OUTPATIENT)
Dept: LAB | Facility: CLINIC | Age: 33
End: 2023-04-24
Payer: COMMERCIAL

## 2023-04-24 VITALS
HEIGHT: 64 IN | RESPIRATION RATE: 16 BRPM | DIASTOLIC BLOOD PRESSURE: 64 MMHG | TEMPERATURE: 98.1 F | BODY MASS INDEX: 33.17 KG/M2 | HEART RATE: 77 BPM | SYSTOLIC BLOOD PRESSURE: 99 MMHG | OXYGEN SATURATION: 100 %

## 2023-04-24 DIAGNOSIS — C56.2 MALIGNANT NEOPLASM OF OVARY, LEFT (H): ICD-10-CM

## 2023-04-24 LAB
AFP SERPL-MCNC: 70.4 NG/ML
CANCER AG125 SERPL-ACNC: 19 U/ML
LDH SERPL L TO P-CCNC: 144 U/L (ref 0–250)

## 2023-04-24 PROCEDURE — 99205 OFFICE O/P NEW HI 60 MIN: CPT | Performed by: OBSTETRICS & GYNECOLOGY

## 2023-04-24 PROCEDURE — 84702 CHORIONIC GONADOTROPIN TEST: CPT | Performed by: OBSTETRICS & GYNECOLOGY

## 2023-04-24 PROCEDURE — 82105 ALPHA-FETOPROTEIN SERUM: CPT | Performed by: OBSTETRICS & GYNECOLOGY

## 2023-04-24 PROCEDURE — 36415 COLL VENOUS BLD VENIPUNCTURE: CPT | Performed by: PATHOLOGY

## 2023-04-24 PROCEDURE — G0463 HOSPITAL OUTPT CLINIC VISIT: HCPCS | Performed by: OBSTETRICS & GYNECOLOGY

## 2023-04-24 PROCEDURE — 86304 IMMUNOASSAY TUMOR CA 125: CPT | Performed by: OBSTETRICS & GYNECOLOGY

## 2023-04-24 PROCEDURE — 83615 LACTATE (LD) (LDH) ENZYME: CPT | Performed by: PATHOLOGY

## 2023-04-24 ASSESSMENT — PAIN SCALES - GENERAL: PAINLEVEL: NO PAIN (0)

## 2023-04-24 NOTE — PROGRESS NOTES
Consult Notes on Referred Patient    Date: 2023       Dr. Toyin Chiu MD  [address] [place] [address]       RE: Muriel Chauhan  : 1990  GIBRAN: 2023    Dear Dr. Toyin Chiu:    I had the pleasure of seeing your patient Muriel Chauhan here at the Gynecologic Cancer Clinic at the [facility] on 2023.  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  Given these findings she was subsequently sent to the Gynecologic Cancer Clinic for new patient consultation.     She was diagnosed with a small ovarian mass (~3cm) at her earliest US.  This was thought to be c/w functional cyst per patient report.  She subsequently had follow up in which the cyst was noted to be enlarged (23 - 7.8 cm), and again at her anatomy scan (3//2023 15 cm).  After discussed of risks and benefits she was taken to the OR by Dr. Cervantes where she underwent an LSO complicated by intra-operative rupture.      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, see comment  - Fallopian tube with no significant histologic abnormality       Review of Systems:    Systemic           no weight changes; no fever; no chills; no night sweats; no appetite changes  Skin           no rashes, or lesions  Eye           no irritation; no changes in vision  Chelsi-Laryngeal           no dysphagia; no hoarseness   Pulmonary    no cough; no shortness of breath  Cardiovascular    no chest pain; no palpitations  Gastrointestinal    no diarrhea; no constipation; no abdominal pain; no changes in bowel  habits; no blood in stool  Genitourinary   no urinary frequency; no urinary urgency; no dysuria; no pain; no abnormal vaginal discharge; no abnormal vaginal  bleeding  Breast   no breast discharge; no breast changes; no breast pain  Musculoskeletal    no myalgias; no arthralgias; no back pain  Psychiatric           no depressed mood; no anxiety    Hematologic           no tender lymph nodes; no noticeable swellings or lumps   Endocrine    no hot flashes; no heat/cold intolerance         Neurological   no tremor; no numbness and tingling; no headaches; no difficulty  sleeping      Past Medical History:    Past Medical History:   Diagnosis Date     Acid reflux      Herpes simplex virus (HSV) infection      PONV (postoperative nausea and vomiting)      Urinary tract infection          Past Surgical History:    Past Surgical History:   Procedure Laterality Date     APPENDECTOMY  01/01/2010     LAPAROTOMY EXPLORATORY N/A 4/12/2023    Procedure: LAPAROTOMY left salpingectomy left oophorectomy, with pelvic washings;  Surgeon: Jordyn Cervantes MD;  Location:  OR         Health Maintenance:  Health Maintenance Due   Topic Date Due     COVID-19 Vaccine (2 - Moderna series) 11/12/2021     INFLUENZA VACCINE (1) 09/01/2022     MATERNAL SCREENING DISCUSSION  Never done     OBGCT (OB)  05/10/2023       Last Pap Smear: Normal to date    Last Mammogram: none    Last Colonoscopy: none      Current Medications:     has a current medication list which includes the following prescription(s): acetaminophen, ferrous sulfate, fish oil-omega-3 fatty acids, ibuprofen, prenatal multivitamin w/iron, senna-docusate sodium, valacyclovir, vitamin c, and vitamin d (cholecalciferol).       Allergies:     Patient has no known allergies.        Social History:     Social History     Tobacco Use     Smoking status: Never     Smokeless tobacco: Never   Vaping Use     Vaping status: Never Used   Substance Use Topics     Alcohol use: Not Currently     Comment: social       History   Drug Use Unknown           Family History:     The patient's family history is notable for .    Family History   Problem  Relation Age of Onset     Cerebrovascular Disease Sister         stroke in late 40's, unknown reason     Colon Cancer No family hx of      Breast Cancer No family hx of      Coronary Artery Disease No family hx of          Physical Exam:     PS 0  VS: P 88 RR14     General Appearance: healthy and alert, no distress     HEENT:  no thyromegaly, no palpable nodules or masses        Cardiovascular: regular rate and rhythm, no gallops, rubs or murmurs     Respiratory: lungs clear, no rales, rhonchi or wheezes, normal diaphragmatic excursion    Musculoskeletal: extremities non tender and without edema    Skin: no lesions or rashes     Neurological: normal gait, no gross defects     Psychiatric: appropriate mood and affect                               Hematological: normal cervical, supraclavicular and inguinal lymph nodes     Gastrointestinal:       abdomen soft, non-tender, non-distended, no organomegaly or masses    Genitourinary: External genitalia and urethral meatus appears normal.  Vagina is smooth without nodularity or masses.  Cervix appears normal and without lesions.  Bimanual exam reveal no masses, nodularity or fullness.  Recto-vaginal exam confirms these findings.      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     A total of 60 minutes was spent with the patient, 40 minutes of which were spent in counseling the patient and/or treatment planning.      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     -We have discussed Discussed the NCCN recommendations (not pregnancy-directed) for staging of adult germ cell tumors and the additional recommendation for 3 cycles of BEP given the high grade of her tumor.      -Her case was discussed at tumor board on 4/21 in anticipation of this meeting, with the recommendation that she undergo an  abdominal and pelvic MRI to assess for any other disease within the abdomen and then recommendation to proceed with 3 cycles of BEP to begin while pregnant.  She has an appointment with them on 4/25.     - we have also discussed that data in the pediatric literature supports observation in her case as despite her tumor grade increasing risk for recurrence, all patients seem to be salvaged with chemotherapy if the disease does recur.    After discussing the pros and cons of treatment during pregnancy verus after delivery, versus observation - she is inclined to MRI evaluation, with consideration of treatment if there is overt metastatic disease.  In the absence of overt mets, she would like time to consider her options.  Will get baseline germ cell markers.  She will follow after her MRI       2.) Genetic risk factors were assessed and the patient does not meet the qualifications for a referral.      3.) Labs and/or tests ordered include:  Germ cell markers.     4.) Health maintenance issues addressed today include none.      Thank you for allowing us to participate in the care of your patient.         Sincerely,    Chema Scott MD    CC  Patient Care Team:  Macey Gregg PA-C as PCP - General (Family Medicine)  Macey Gregg PA-C as Assigned PCP  Poppy Flower APRN CNM as Assigned OBGYN Provider  Toyin Chiu MD as MD (Gynecologic Oncology)  TOYIN CHIU

## 2023-04-24 NOTE — PATIENT INSTRUCTIONS
It was a pleasure seeing you in clinic today-please reach out if there are any further questions that arise following today's visit.  During business hours, you may reach me at [phone].  For urgent/emergent questions after business hours, you may reach the on-call Gynecologic Oncology Resident through the [facility]  at [phone].    All normal test results are usually communicated via letter or [employer] message.  Abnormal results (those that require a change in the previously discussed plan of care) are usually communicated via a phone call.    I recommend signing up for [employer] access if you have not already done so.  This allows you online access to your lab results and also helps you communicate efficiently with your clinic should any questions arise in your care.    Follow up appointment in 1 week with MD scheduling will call you to help make an appointment  referral to radiology for MRI  labs done today, you will be notified via my chart/telephone with test results    Dr Tyler Rankin RN  Phone:  [phone]  Fax:  [phone]

## 2023-04-24 NOTE — NURSING NOTE
"Oncology Rooming Note    April 24, 2023 2:26 PM   Muriel Chauhan is a 32 year old female who presents for:    Chief Complaint   Patient presents with     Oncology Clinic Visit     [facility] NEW - OVARIAN CANCER     Initial Vitals: BP 99/64 (BP Location: Left arm, Patient Position: Chair, Cuff Size: Adult Large)   Pulse 77   Temp 98.1  F (36.7  C)   Resp 16   Ht 1.626 m (5' 4.02\")   LMP 11/23/2022 (Exact Date)   SpO2 100%   BMI 33.17 kg/m   Estimated body mass index is 33.17 kg/m  as calculated from the following:    Height as of this encounter: 1.626 m (5' 4.02\").    Weight as of 4/12/23: 87.7 kg (193 lb 5.5 oz). Body surface area is 1.99 meters squared.  No Pain (0) Comment: Data Unavailable   Patient's last menstrual period was 11/23/2022 (exact date).  Allergies reviewed: Yes  Medications reviewed: Yes    Medications: Medication refills not needed today.  Pharmacy name entered into EPIC: [id number] - [place], [place] - [address], [place]            "

## 2023-04-24 NOTE — PROGRESS NOTES
Maternal-Fetal Medicine Consultation    Muriel Chauhan  : 1990  MRN: [id number]    REFERRAL:  Muriel Chauhan is a 32 year old sent by Dr. Flower from [facility] for MFM consultation.    HPI:  Muriel Chauhan is a 32 year old  at 21w6d by LMP consistent with 13w3d US here for MFM consultation for unstaged (at least stage 1C1) immature teratoma grade 3, 13.9 cm).    She is here with her her mother    Ms Chauhan was noted to have a 2.5 cm left ovarian cyst on 2022 and at 13 week US (23) it was noted to be 7.8 cm.  On 3/29/23, it had increased in size to 15 cm.   She underwent exploratory laparotomy, LSO, pelvic washings on 23 with Dr. Cervantes.  Pathology returned with immature teratoma (grade 3, 13.9 cm).  Fallopian tube negative.  She saw Dr. Scott 23 with recommendationthat she undergo an abdominal and pelvic MRI to assess for any other disease within the abdomen and then consideration to proceed with 3 cycles of BEP to begin while pregnant.  While NCCN guideslines would recommend treatment for this stage of disease,  there is data in the pediatric literature that supports observation in her case as despite her tumor grade increasing risk for recurrence, since all patients seem to be salvaged with chemotherapy if the disease does recur.      Today, she mostly is anxious to learn how her son is growing and would like to defer discussion related to treatment and coordination with pregnancy until the results of her MRI are available.      This is a strongly desired pregnancy and she is not interest in discussion options for pregnancy termination.       She is interested in learning more about available resources for financial support if she pursues treatment.     Prenatal Care:  Primary OB care this pregnancy has been with  from [facility].    Obstetrics History:  OB History    Para Term  AB Living   1 0 0 0 0 0   SAB IAB  Ectopic Multiple Live Births   0 0 0 0 0      # Outcome Date GA Lbr Christiano/2nd Weight Sex Delivery Anes PTL Lv   1 Current                  Past Medical History:  Past Medical History:   Diagnosis Date     Herpes simplex virus (HSV) infection      Immature teratoma of left ovary (H)        Past Surgical History:  Past Surgical History:   Procedure Laterality Date     APPENDECTOMY  2010     LAPAROTOMY EXPLORATORY N/A 2023    Procedure: LAPAROTOMY left salpingectomy left oophorectomy, with pelvic washings;  Surgeon: Jordyn Cervantes MD;  Location: [facility] OR     LEFT OOPHORECTOMY       Allergies:  Patient has no known allergies.    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        Family History   Problem Relation Age of Onset     Cerebrovascular Disease Sister         stroke in late 40's, unknown reason     Colon Cancer No family hx of      Breast Cancer No family hx of      Coronary Artery Disease No family hx of      Ovarian Cancer No family hx of      Uterine Cancer No family hx of          Other Imaging:   US today shows normal fetal growth, new diagnosis of left UTD,A1      ASSESSMENT/PLAN:  Muriel Chauhan is a 32 year old  at 21w6d by LMP consistent with 13w3d US here for MFM consultation for unstaged (at least stage 1C1) immature teratoma grade 3, 13.9 cm).    ##Immature Teratoma in Pregnancy   We discussed her recent diagnosis and considerations that may come up when weighing treatment of in pregnancy vs deferring until after pregnancy.   She would like results of the MRI before considering options further.  We reviewed that there are  options for chemotherapy in pregnancy and options to defer chemotherapy with active surveillance with imaging until after pregnancy. We also discussed that there are options to not continue this pregnancy, though she notes that this is a pregnancy she strongly desires to continue.     We discussed that broadly, we typically recommend that patients diagnosed with cancer in pregnancy, receive standard of care treatment in pregnancy. In her case, there are few case studies to guide treatment of immature teratomas in pregnancy.  However, there are reports of patients deferring chemo until after delivery and some receiving BEP in pregnancy both with favorable outcomes.   We discussed that chemotherapy can be associated with fetal growth abnormalities and cisplatin is associated with a 10% risk of hearing loss in exposed offspring.  We would recommend closely following fetal growth in pregnancy.  If she pursues chemotherapy, we would recommend that the last dose be delivered at least 2-3 weeks prior to delivery.       Given that additional clinical information is pending, we have recommended that she return to [facility] clinic for further discussion of pregnancy considerations for treatment after her imaging has returned and she has further considered options.        ##UTD,A1  Left UTD,A1 seen.  Please see US note for counseling on this finding.  No aneuploidy screening desired.  Will plan to reassess >28 weeks in pregnancy.        We will work to connect her with social work resources at her request since she has questions about additional financial support for care needs in pregnancy.       Thank you for allowing us to participate in the care of your patient. Please do not hesitate to contact us if you have further questions regarding the management of your patient.     I spent 75 minutes in the care of this patient on 4/25/2023.  This included reviewing notes, operative findings, US imaging, discussing case with Dr. Scott,  meeting with Muriel and her mother, and documenting.       Lanny Barragan MD PhD  Department of Obstetrics, Gynecology and Women's Health  Maternal Fetal Medicine Division

## 2023-04-24 NOTE — LETTER
2023         RE: Muriel Chauhan  [address] [place] [address]        Dear Colleague,    Thank you for referring your patient, Muriel Chauhan, to the [facility]. Please see a copy of my visit note below.                            Consult Notes on Referred Patient    Date: 2023       Dr. Toyin Chiu MD  [address] [place] [address]       RE: Muriel Chauhan  : 1990  GIBRAN: 2023    Dear Dr. Toyin Chiu:    I had the pleasure of seeing your patient Muriel Chauhan here at the Gynecologic Cancer Clinic at the [facility] on 2023.  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  Given these findings she was subsequently sent to the Gynecologic Cancer Clinic for new patient consultation.     She was diagnosed with a small ovarian mass (~3cm) at her earliest US.  This was thought to be c/w functional cyst per patient report.  She subsequently had follow up in which the cyst was noted to be enlarged (23 - 7.8 cm), and again at her anatomy scan (3//2023 15 cm).  After discussed of risks and benefits she was taken to the OR by Dr. Cervantes where she underwent an LSO complicated by intra-operative rupture.      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, see comment  - Fallopian tube with no significant histologic abnormality       Review of Systems:    Systemic           no weight changes; no fever; no chills; no night sweats; no appetite changes  Skin           no rashes, or lesions  Eye           no irritation; no changes in vision  Chelsi-Laryngeal           no dysphagia; no hoarseness   Pulmonary    no cough; no shortness of breath  Cardiovascular    no chest pain; no  palpitations  Gastrointestinal    no diarrhea; no constipation; no abdominal pain; no changes in bowel  habits; no blood in stool  Genitourinary   no urinary frequency; no urinary urgency; no dysuria; no pain; no abnormal vaginal discharge; no abnormal vaginal bleeding  Breast   no breast discharge; no breast changes; no breast pain  Musculoskeletal    no myalgias; no arthralgias; no back pain  Psychiatric           no depressed mood; no anxiety    Hematologic           no tender lymph nodes; no noticeable swellings or lumps   Endocrine    no hot flashes; no heat/cold intolerance         Neurological   no tremor; no numbness and tingling; no headaches; no difficulty  sleeping      Past Medical History:    Past Medical History:   Diagnosis Date    Acid reflux     Herpes simplex virus (HSV) infection     PONV (postoperative nausea and vomiting)     Urinary tract infection          Past Surgical History:    Past Surgical History:   Procedure Laterality Date    APPENDECTOMY  01/01/2010    LAPAROTOMY EXPLORATORY N/A 4/12/2023    Procedure: LAPAROTOMY left salpingectomy left oophorectomy, with pelvic washings;  Surgeon: Jordyn Cervatnes MD;  Location:  OR         Health Maintenance:  Health Maintenance Due   Topic Date Due    COVID-19 Vaccine (2 - Moderna series) 11/12/2021    INFLUENZA VACCINE (1) 09/01/2022    MATERNAL SCREENING DISCUSSION  Never done    OBGCT (OB)  05/10/2023       Last Pap Smear: Normal to date    Last Mammogram: none    Last Colonoscopy: none      Current Medications:     has a current medication list which includes the following prescription(s): acetaminophen, ferrous sulfate, fish oil-omega-3 fatty acids, ibuprofen, prenatal multivitamin w/iron, senna-docusate sodium, valacyclovir, vitamin c, and vitamin d (cholecalciferol).       Allergies:     Patient has no known allergies.        Social History:     Social History     Tobacco Use    Smoking status: Never    Smokeless tobacco: Never    Vaping Use    Vaping status: Never Used   Substance Use Topics    Alcohol use: Not Currently     Comment: social       History   Drug Use Unknown           Family History:     The patient's family history is notable for .    Family History   Problem Relation Age of Onset    Cerebrovascular Disease Sister         stroke in late 40's, unknown reason    Colon Cancer No family hx of     Breast Cancer No family hx of     Coronary Artery Disease No family hx of          Physical Exam:     PS 0  VS: P 88 RR14     General Appearance: healthy and alert, no distress     HEENT:  no thyromegaly, no palpable nodules or masses        Cardiovascular: regular rate and rhythm, no gallops, rubs or murmurs     Respiratory: lungs clear, no rales, rhonchi or wheezes, normal diaphragmatic excursion    Musculoskeletal: extremities non tender and without edema    Skin: no lesions or rashes     Neurological: normal gait, no gross defects     Psychiatric: appropriate mood and affect                               Hematological: normal cervical, supraclavicular and inguinal lymph nodes     Gastrointestinal:       abdomen soft, non-tender, non-distended, no organomegaly or masses    Genitourinary: External genitalia and urethral meatus appears normal.  Vagina is smooth without nodularity or masses.  Cervix appears normal and without lesions.  Bimanual exam reveal no masses, nodularity or fullness.  Recto-vaginal exam confirms these findings.      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     A total of 60 minutes was spent with the patient, 40 minutes of which were spent in counseling the patient and/or treatment planning.      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     -We have discussed Discussed the NCCN recommendations (not pregnancy-directed) for  staging of adult germ cell tumors and the additional recommendation for 3 cycles of BEP given the high grade of her tumor.      -Her case was discussed at tumor board on 4/21 in anticipation of this meeting, with the recommendation that she undergo an abdominal and pelvic MRI to assess for any other disease within the abdomen and then recommendation to proceed with 3 cycles of BEP to begin while pregnant.  She has an appointment with them on 4/25.     - we have also discussed that data in the pediatric literature supports observation in her case as despite her tumor grade increasing risk for recurrence, all patients seem to be salvaged with chemotherapy if the disease does recur.    After discussing the pros and cons of treatment during pregnancy verus after delivery, versus observation - she is inclined to MRI evaluation, with consideration of treatment if there is overt metastatic disease.  In the absence of overt mets, she would like time to consider her options.  Will get baseline germ cell markers.  She will follow after her MRI       2.) Genetic risk factors were assessed and the patient does not meet the qualifications for a referral.      3.) Labs and/or tests ordered include:  Germ cell markers.     4.) Health maintenance issues addressed today include none.      Thank you for allowing us to participate in the care of your patient.         Sincerely,    Chema Scott MD      Patient Care Team:  Macey Gregg PA-C as PCP - General (Family Medicine)  Macey Gregg PA-C as Assigned PCP  Poppy Flower APRN CNM as Assigned OBGYN Provider  Toyin Chiu MD as MD (Gynecologic Oncology)  TOYIN CHIU        Again, thank you for allowing me to participate in the care of your patient.        Sincerely,        Chema Scott MD

## 2023-04-25 ENCOUNTER — HOSPITAL ENCOUNTER (OUTPATIENT)
Dept: ULTRASOUND IMAGING | Facility: CLINIC | Age: 33
Discharge: HOME | End: 2023-04-25
Attending: OBSTETRICS & GYNECOLOGY
Payer: COMMERCIAL

## 2023-04-25 ENCOUNTER — OFFICE VISIT (OUTPATIENT)
Dept: MATERNAL FETAL MEDICINE | Facility: CLINIC | Age: 33
End: 2023-04-25
Attending: OBSTETRICS & GYNECOLOGY
Payer: COMMERCIAL

## 2023-04-25 ENCOUNTER — HOSPITAL ENCOUNTER (OUTPATIENT)
Dept: MRI IMAGING | Facility: HOSPITAL | Age: 33
Discharge: HOME | End: 2023-04-25
Attending: OBSTETRICS & GYNECOLOGY
Payer: COMMERCIAL

## 2023-04-25 VITALS
RESPIRATION RATE: 16 BRPM | OXYGEN SATURATION: 100 % | SYSTOLIC BLOOD PRESSURE: 111 MMHG | DIASTOLIC BLOOD PRESSURE: 76 MMHG

## 2023-04-25 DIAGNOSIS — N83.209 OVARIAN CYST AFFECTING PREGNANCY IN SECOND TRIMESTER, ANTEPARTUM: ICD-10-CM

## 2023-04-25 DIAGNOSIS — C56.2 MALIGNANT NEOPLASM OF OVARY, LEFT (H): ICD-10-CM

## 2023-04-25 DIAGNOSIS — O34.82 OVARIAN CYST AFFECTING PREGNANCY IN SECOND TRIMESTER, ANTEPARTUM: ICD-10-CM

## 2023-04-25 LAB — HCG-TM SERPL-ACNC: 4349 IU/L

## 2023-04-25 PROCEDURE — 76816 OB US FOLLOW-UP PER FETUS: CPT | Mod: 26 | Performed by: OBSTETRICS & GYNECOLOGY

## 2023-04-25 PROCEDURE — 99215 OFFICE O/P EST HI 40 MIN: CPT | Mod: 25 | Performed by: OBSTETRICS & GYNECOLOGY

## 2023-04-25 PROCEDURE — 74181 MRI ABDOMEN W/O CONTRAST: CPT

## 2023-04-25 PROCEDURE — 99417 PROLNG OP E/M EACH 15 MIN: CPT | Performed by: OBSTETRICS & GYNECOLOGY

## 2023-04-25 PROCEDURE — G0463 HOSPITAL OUTPT CLINIC VISIT: HCPCS | Mod: 25 | Performed by: OBSTETRICS & GYNECOLOGY

## 2023-04-25 PROCEDURE — 72195 MRI PELVIS W/O DYE: CPT

## 2023-04-25 PROCEDURE — 76816 OB US FOLLOW-UP PER FETUS: CPT

## 2023-04-25 PROCEDURE — 71550 MRI CHEST W/O DYE: CPT

## 2023-04-25 ASSESSMENT — PAIN SCALES - GENERAL: PAINLEVEL: NO PAIN (0)

## 2023-04-25 NOTE — NURSING NOTE
Pt here for RL2 and MFM consult d/t new dx of ovarian cancer. Seen by Dr. Barragan, see her notes for POC.

## 2023-04-26 ENCOUNTER — PRENATAL OFFICE VISIT (OUTPATIENT)
Dept: OBGYN | Facility: CLINIC | Age: 33
End: 2023-04-26
Payer: COMMERCIAL

## 2023-04-26 VITALS
BODY MASS INDEX: 32.73 KG/M2 | TEMPERATURE: 97.3 F | SYSTOLIC BLOOD PRESSURE: 116 MMHG | WEIGHT: 190.8 LBS | HEART RATE: 94 BPM | DIASTOLIC BLOOD PRESSURE: 68 MMHG | OXYGEN SATURATION: 99 %

## 2023-04-26 DIAGNOSIS — Z34.02 ENCOUNTER FOR SUPERVISION OF NORMAL FIRST PREGNANCY IN SECOND TRIMESTER: Primary | ICD-10-CM

## 2023-04-26 DIAGNOSIS — C56.2 MALIGNANT NEOPLASM OF OVARY, LEFT (H): ICD-10-CM

## 2023-04-26 PROCEDURE — 99212 OFFICE O/P EST SF 10 MIN: CPT | Mod: 24 | Performed by: OBSTETRICS & GYNECOLOGY

## 2023-04-26 NOTE — PROGRESS NOTES
Here for post op check. Doing well and had MRI of c/a/p yesterday that was all normal. 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 Doesn't want to risk baby at all. Here with her mother. Is taking the oral iron. Discussed care going back to [facility] group would be fine and can do GCT with next visit. Plan growth u/s with MFM and recheck pyelectasis at 28 wks. Incision healing well and discussed glue can come off in 2 weeks. She plans on going back to [place] prior to delivery and discussed going soon, needs to be back here by 8/1=36 wks. RTC 4 weeks. BE

## 2023-04-27 DIAGNOSIS — N83.202 LEFT OVARIAN CYST: Primary | ICD-10-CM

## 2023-05-01 ENCOUNTER — PATIENT OUTREACH (OUTPATIENT)
Dept: ONCOLOGY | Facility: CLINIC | Age: 33
End: 2023-05-01

## 2023-05-24 ENCOUNTER — PRENATAL OFFICE VISIT (OUTPATIENT)
Dept: MIDWIFE SERVICES | Facility: CLINIC | Age: 33
End: 2023-05-24
Payer: COMMERCIAL

## 2023-05-24 VITALS
WEIGHT: 197 LBS | OXYGEN SATURATION: 100 % | HEART RATE: 72 BPM | DIASTOLIC BLOOD PRESSURE: 68 MMHG | SYSTOLIC BLOOD PRESSURE: 105 MMHG | BODY MASS INDEX: 33.8 KG/M2

## 2023-05-24 DIAGNOSIS — D50.9 IRON DEFICIENCY ANEMIA, UNSPECIFIED IRON DEFICIENCY ANEMIA TYPE: ICD-10-CM

## 2023-05-24 DIAGNOSIS — O98.512 HSV-2 INFECTION COMPLICATING PREGNANCY, SECOND TRIMESTER: ICD-10-CM

## 2023-05-24 DIAGNOSIS — O99.019 ANEMIA IN PREGNANCY: Primary | ICD-10-CM

## 2023-05-24 DIAGNOSIS — B00.9 HSV-2 INFECTION COMPLICATING PREGNANCY, SECOND TRIMESTER: ICD-10-CM

## 2023-05-24 DIAGNOSIS — Z34.02 ENCOUNTER FOR SUPERVISION OF NORMAL FIRST PREGNANCY IN SECOND TRIMESTER: Primary | ICD-10-CM

## 2023-05-24 DIAGNOSIS — O99.019 ANTEPARTUM ANEMIA: ICD-10-CM

## 2023-05-24 LAB
ERYTHROCYTE [DISTWIDTH] IN BLOOD BY AUTOMATED COUNT: 14.9 % (ref 10–15)
GLUCOSE 1H P 50 G GLC PO SERPL-MCNC: 141 MG/DL (ref 70–129)
HCT VFR BLD AUTO: 32 % (ref 35–47)
HGB BLD-MCNC: 10.5 G/DL (ref 11.7–15.7)
HGB BLD-MCNC: 10.5 G/DL (ref 11.7–15.7)
HOLD SPECIMEN: NORMAL
MCH RBC QN AUTO: 31 PG (ref 26.5–33)
MCHC RBC AUTO-ENTMCNC: 32.8 G/DL (ref 31.5–36.5)
MCV RBC AUTO: 94 FL (ref 78–100)
PLATELET # BLD AUTO: 167 10E3/UL (ref 150–450)
RBC # BLD AUTO: 3.39 10E6/UL (ref 3.8–5.2)
WBC # BLD AUTO: 6.3 10E3/UL (ref 4–11)

## 2023-05-24 PROCEDURE — 82950 GLUCOSE TEST: CPT | Performed by: ADVANCED PRACTICE MIDWIFE

## 2023-05-24 PROCEDURE — 85027 COMPLETE CBC AUTOMATED: CPT | Performed by: ADVANCED PRACTICE MIDWIFE

## 2023-05-24 PROCEDURE — 36415 COLL VENOUS BLD VENIPUNCTURE: CPT | Performed by: ADVANCED PRACTICE MIDWIFE

## 2023-05-24 PROCEDURE — 99212 OFFICE O/P EST SF 10 MIN: CPT | Mod: 24 | Performed by: ADVANCED PRACTICE MIDWIFE

## 2023-05-24 RX ORDER — DIPHENHYDRAMINE HYDROCHLORIDE 50 MG/ML
50 INJECTION INTRAMUSCULAR; INTRAVENOUS
Status: CANCELLED
Start: 2023-05-24

## 2023-05-24 RX ORDER — ALBUTEROL SULFATE 0.83 MG/ML
2.5 SOLUTION RESPIRATORY (INHALATION)
Status: CANCELLED | OUTPATIENT
Start: 2023-05-24

## 2023-05-24 RX ORDER — METHYLPREDNISOLONE SODIUM SUCCINATE 125 MG/2ML
125 INJECTION, POWDER, LYOPHILIZED, FOR SOLUTION INTRAMUSCULAR; INTRAVENOUS
Status: CANCELLED
Start: 2023-05-24

## 2023-05-24 RX ORDER — VALACYCLOVIR HYDROCHLORIDE 500 MG/1
500 TABLET, FILM COATED ORAL 2 TIMES DAILY
Qty: 180 TABLET | Refills: 1 | Status: SHIPPED | OUTPATIENT
Start: 2023-05-24 | End: 2023-05-25

## 2023-05-24 RX ORDER — FERROUS GLUCONATE 324(38)MG
324 TABLET ORAL EVERY OTHER DAY
Qty: 90 TABLET | Refills: 1 | Status: SHIPPED | OUTPATIENT
Start: 2023-05-24 | End: 2023-05-25

## 2023-05-24 RX ORDER — ASCORBIC ACID 250 MG
250 TABLET,CHEWABLE ORAL EVERY OTHER DAY
Qty: 90 TABLET | Refills: 1 | Status: SHIPPED | OUTPATIENT
Start: 2023-05-24

## 2023-05-24 RX ORDER — HEPARIN SODIUM,PORCINE 10 UNIT/ML
5 VIAL (ML) INTRAVENOUS
Status: CANCELLED | OUTPATIENT
Start: 2023-05-24

## 2023-05-24 RX ORDER — ALBUTEROL SULFATE 90 UG/1
1-2 AEROSOL, METERED RESPIRATORY (INHALATION)
Status: CANCELLED
Start: 2023-05-24

## 2023-05-24 RX ORDER — HEPARIN SODIUM (PORCINE) LOCK FLUSH IV SOLN 100 UNIT/ML 100 UNIT/ML
5 SOLUTION INTRAVENOUS
Status: CANCELLED | OUTPATIENT
Start: 2023-05-24

## 2023-05-24 RX ORDER — LANOLIN ALCOHOL/MO/W.PET/CERES
1000 CREAM (GRAM) TOPICAL EVERY OTHER DAY
Qty: 90 TABLET | Refills: 1 | Status: SHIPPED | OUTPATIENT
Start: 2023-05-24 | End: 2023-05-25

## 2023-05-24 RX ORDER — EPINEPHRINE 1 MG/ML
0.3 INJECTION, SOLUTION, CONCENTRATE INTRAVENOUS EVERY 5 MIN PRN
Status: CANCELLED | OUTPATIENT
Start: 2023-05-24

## 2023-05-24 NOTE — TELEPHONE ENCOUNTER
5/8  No response from pt, appt not rescheduled  Left message on voice mail for pt to call clinic back

## 2023-05-24 NOTE — TELEPHONE ENCOUNTER
5/22   Pt has not rescheduled missed appts  md needs to review mri and discuss plan   5/23  Gave md information he will try to call pt  5/24 Attempted to contact pt via phone unable to leave message  Contacted pt emergency contact spouse, spoke with spouse informed on missed appt and attempts to contact pt.  Asked that he give pt message to call clinic

## 2023-05-24 NOTE — LETTER
May 24, 2023      Muriel Chauhan  [address] [place] [address]        To Whom It May Concern:    Muriel Chauhan was seen in our clinic 4/12/2023. She may return to work without restrictions. Please call the clinic at [phone] with any questions or concerns.      Sincerely,        KRYSTLE Bashir CNM

## 2023-05-24 NOTE — PROGRESS NOTES
26w0d  Patient is feeling well. Positive fetal movement. Denies water leaking, vaginal bleeding, decreased fetal movement, contraction pain, or headaches.   Doing well and feels good. Going back to work tomorrow, work note given, no restrictions. Healing well, some pain on the left side of the scar, lots of scar tissue felt around the scar, about 1-2 cm on each side. Pain is manageable, more when palpation of the area. GCT and HGB today. Did 1 dose of IV iron, no oral iron. Discussed oral iron, open to that or IV if needed, will decide pending results. Leaving for [place] June 18th. Will be gone for about a month. No other questions or concerns today.   Danger signs reviewed, pre-eclampsia signs and symptoms discussed.   Knows when to call triage and has phone numbers.   Follow up in 2 weeks after [facility] ultrasound.   KRYSTLE Bashir CNM

## 2023-05-25 ENCOUNTER — NURSE TRIAGE (OUTPATIENT)
Dept: NURSING | Facility: CLINIC | Age: 33
End: 2023-05-25
Payer: COMMERCIAL

## 2023-05-25 DIAGNOSIS — D50.9 IRON DEFICIENCY ANEMIA, UNSPECIFIED IRON DEFICIENCY ANEMIA TYPE: ICD-10-CM

## 2023-05-25 DIAGNOSIS — B00.9 HSV-2 INFECTION COMPLICATING PREGNANCY, SECOND TRIMESTER: ICD-10-CM

## 2023-05-25 DIAGNOSIS — O98.512 HSV-2 INFECTION COMPLICATING PREGNANCY, SECOND TRIMESTER: ICD-10-CM

## 2023-05-25 RX ORDER — VALACYCLOVIR HYDROCHLORIDE 500 MG/1
500 TABLET, FILM COATED ORAL 2 TIMES DAILY
Qty: 180 TABLET | Refills: 1 | Status: SHIPPED | OUTPATIENT
Start: 2023-05-25

## 2023-05-25 RX ORDER — LANOLIN ALCOHOL/MO/W.PET/CERES
1000 CREAM (GRAM) TOPICAL EVERY OTHER DAY
Qty: 90 TABLET | Refills: 1 | Status: SHIPPED | OUTPATIENT
Start: 2023-05-25

## 2023-05-25 RX ORDER — FERROUS GLUCONATE 324(38)MG
324 TABLET ORAL EVERY OTHER DAY
Qty: 90 TABLET | Refills: 1 | Status: SHIPPED | OUTPATIENT
Start: 2023-05-25

## 2023-05-25 NOTE — TELEPHONE ENCOUNTER
Triage Call:    Patient calling with medication question. She reports that her pharmacy reports not receiving prescriptions for iron, B12, and Valtrex.  Verified preferred pharmacy.  Resent prescriptions to pharmacy per patient request.    Tosin Pereyra RN  05/25/23 6:20 PM  [facility] Nurse Advisor    Reason for Disposition    [1] Prescription prescribed recently is not at pharmacy AND [2] triager has access to patient's EMR AND [3] prescription is recorded in the EMR    Additional Information    Negative: [1] Intentional drug overdose AND [2] suicidal thoughts or ideas    Negative: Drug overdose and triager unable to answer question    Negative: Caller requesting a renewal or refill of a medicine patient is currently taking    Negative: Caller requesting information unrelated to medicine    Negative: Caller requesting information about COVID-19 Vaccine    Negative: Caller requesting information about Emergency Contraception    Negative: Caller requesting information about Combined Birth Control Pills    Negative: Caller requesting information about Progestin Birth Control Pills    Negative: Caller requesting information about Post-Op pain or medicines    Negative: Caller requesting a prescription antibiotic (such as Penicillin) for Strep throat and has a positive culture result    Negative: Caller requesting a prescription anti-viral med (such as Tamiflu) and has influenza (flu) symptoms    Negative: Immunization reaction suspected    Negative: Rash while taking a medicine or within 3 days of stopping it    Negative: [1] Asthma and [2] having symptoms of asthma (cough, wheezing, etc.)    Negative: [1] Symptom of illness (e.g., headache, abdominal pain, earache, vomiting) AND [2] more than mild    Negative: Breastfeeding questions about mother's medicines and diet    Negative: MORE THAN A DOUBLE DOSE of a prescription or over-the-counter (OTC) drug    Negative: [1] DOUBLE DOSE (an extra dose or lesser  amount) of prescription drug AND [2] any symptoms (e.g., dizziness, nausea, pain, sleepiness)    Negative: [1] DOUBLE DOSE (an extra dose or lesser amount) of over-the-counter (OTC) drug AND [2] any symptoms (e.g., dizziness, nausea, pain, sleepiness)    Negative: Took another person's prescription drug    Negative: [1] DOUBLE DOSE (an extra dose or lesser amount) of prescription drug AND [2] NO symptoms (Exception: a double dose of antibiotics)    Negative: Diabetes drug error or overdose (e.g., took wrong type of insulin or took extra dose)    Negative: [1] Prescription not at pharmacy AND [2] was prescribed by PCP recently (Exception: triager has access to EMR and prescription is recorded there. Go to Home Care and confirm for pharmacy.)    Negative: [1] Pharmacy calling with prescription question AND [2] triager unable to answer question    Negative: [1] Caller has URGENT medicine question about med that PCP or specialist prescribed AND [2] triager unable to answer question    Negative: Medicine patch causing local rash or itching    Negative: [1] Caller has medicine question about med NOT prescribed by PCP AND [2] triager unable to answer question (e.g., compatibility with other med, storage)    Negative: [1] Caller has NON-URGENT medicine question about med that PCP prescribed AND [2] triager unable to answer question    Negative: Caller wants to use a complementary or alternative medicine    Negative: Prescription request for new medicine (not a refill)    Protocols used: MEDICATION QUESTION CALL-A-

## 2023-05-31 ENCOUNTER — LAB (OUTPATIENT)
Dept: LAB | Facility: CLINIC | Age: 33
End: 2023-05-31
Payer: COMMERCIAL

## 2023-05-31 ENCOUNTER — INFUSION THERAPY VISIT (OUTPATIENT)
Dept: INFUSION THERAPY | Facility: HOSPITAL | Age: 33
End: 2023-05-31
Attending: ADVANCED PRACTICE MIDWIFE
Payer: COMMERCIAL

## 2023-05-31 VITALS
HEART RATE: 65 BPM | RESPIRATION RATE: 16 BRPM | OXYGEN SATURATION: 98 % | DIASTOLIC BLOOD PRESSURE: 58 MMHG | SYSTOLIC BLOOD PRESSURE: 103 MMHG | TEMPERATURE: 97.7 F

## 2023-05-31 DIAGNOSIS — O99.019 ANTEPARTUM ANEMIA: Primary | ICD-10-CM

## 2023-05-31 DIAGNOSIS — D50.9 IRON DEFICIENCY ANEMIA, UNSPECIFIED IRON DEFICIENCY ANEMIA TYPE: ICD-10-CM

## 2023-05-31 DIAGNOSIS — Z34.02 ENCOUNTER FOR SUPERVISION OF NORMAL FIRST PREGNANCY IN SECOND TRIMESTER: ICD-10-CM

## 2023-05-31 PROCEDURE — 83550 IRON BINDING TEST: CPT

## 2023-05-31 PROCEDURE — 82951 GLUCOSE TOLERANCE TEST (GTT): CPT

## 2023-05-31 PROCEDURE — 82728 ASSAY OF FERRITIN: CPT

## 2023-05-31 PROCEDURE — 36415 COLL VENOUS BLD VENIPUNCTURE: CPT

## 2023-05-31 PROCEDURE — 96365 THER/PROPH/DIAG IV INF INIT: CPT

## 2023-05-31 PROCEDURE — 83540 ASSAY OF IRON: CPT

## 2023-05-31 PROCEDURE — 258N000003 HC RX IP 258 OP 636: Performed by: ADVANCED PRACTICE MIDWIFE

## 2023-05-31 PROCEDURE — 250N000011 HC RX IP 250 OP 636: Performed by: ADVANCED PRACTICE MIDWIFE

## 2023-05-31 PROCEDURE — 82952 GTT-ADDED SAMPLES: CPT

## 2023-05-31 PROCEDURE — 96366 THER/PROPH/DIAG IV INF ADDON: CPT

## 2023-05-31 RX ORDER — EPINEPHRINE 1 MG/ML
0.3 INJECTION, SOLUTION INTRAMUSCULAR; SUBCUTANEOUS EVERY 5 MIN PRN
Status: CANCELLED | OUTPATIENT
Start: 2023-06-02

## 2023-05-31 RX ORDER — HEPARIN SODIUM,PORCINE 10 UNIT/ML
5 VIAL (ML) INTRAVENOUS
Status: CANCELLED | OUTPATIENT
Start: 2023-06-02

## 2023-05-31 RX ORDER — ALBUTEROL SULFATE 90 UG/1
1-2 AEROSOL, METERED RESPIRATORY (INHALATION)
Status: CANCELLED
Start: 2023-06-02

## 2023-05-31 RX ORDER — ALBUTEROL SULFATE 0.83 MG/ML
2.5 SOLUTION RESPIRATORY (INHALATION)
Status: DISCONTINUED | OUTPATIENT
Start: 2023-05-31 | End: 2023-05-31 | Stop reason: HOSPADM

## 2023-05-31 RX ORDER — DIPHENHYDRAMINE HYDROCHLORIDE 50 MG/ML
50 INJECTION INTRAMUSCULAR; INTRAVENOUS
Status: CANCELLED
Start: 2023-06-02

## 2023-05-31 RX ORDER — ALBUTEROL SULFATE 0.83 MG/ML
2.5 SOLUTION RESPIRATORY (INHALATION)
Status: CANCELLED | OUTPATIENT
Start: 2023-06-02

## 2023-05-31 RX ORDER — EPINEPHRINE 1 MG/ML
0.3 INJECTION, SOLUTION INTRAMUSCULAR; SUBCUTANEOUS EVERY 5 MIN PRN
Status: DISCONTINUED | OUTPATIENT
Start: 2023-05-31 | End: 2023-05-31 | Stop reason: HOSPADM

## 2023-05-31 RX ORDER — METHYLPREDNISOLONE SODIUM SUCCINATE 125 MG/2ML
125 INJECTION, POWDER, LYOPHILIZED, FOR SOLUTION INTRAMUSCULAR; INTRAVENOUS
Status: CANCELLED
Start: 2023-06-02

## 2023-05-31 RX ORDER — DIPHENHYDRAMINE HYDROCHLORIDE 50 MG/ML
50 INJECTION INTRAMUSCULAR; INTRAVENOUS
Status: DISCONTINUED | OUTPATIENT
Start: 2023-05-31 | End: 2023-05-31 | Stop reason: HOSPADM

## 2023-05-31 RX ORDER — ALBUTEROL SULFATE 90 UG/1
1-2 AEROSOL, METERED RESPIRATORY (INHALATION)
Status: DISCONTINUED | OUTPATIENT
Start: 2023-05-31 | End: 2023-05-31 | Stop reason: HOSPADM

## 2023-05-31 RX ORDER — HEPARIN SODIUM (PORCINE) LOCK FLUSH IV SOLN 100 UNIT/ML 100 UNIT/ML
5 SOLUTION INTRAVENOUS
Status: CANCELLED | OUTPATIENT
Start: 2023-06-02

## 2023-05-31 RX ORDER — METHYLPREDNISOLONE SODIUM SUCCINATE 125 MG/2ML
125 INJECTION, POWDER, LYOPHILIZED, FOR SOLUTION INTRAMUSCULAR; INTRAVENOUS
Status: DISCONTINUED | OUTPATIENT
Start: 2023-05-31 | End: 2023-05-31 | Stop reason: HOSPADM

## 2023-05-31 RX ADMIN — SODIUM CHLORIDE 250 ML: 9 INJECTION, SOLUTION INTRAVENOUS at 13:57

## 2023-05-31 RX ADMIN — IRON SUCROSE 300 MG: 20 INJECTION, SOLUTION INTRAVENOUS at 13:57

## 2023-05-31 ASSESSMENT — PAIN SCALES - GENERAL: PAINLEVEL: NO PAIN (0)

## 2023-05-31 NOTE — PROGRESS NOTES
.Infusion Nursing Note:  Muriel Chauhan presents today for Venofer 1/3   Patient seen by provider today: No   present during visit today: Not Applicable.    Note: Muriel arrived ambulatory into the infusion center for 1/3 Venofer, VSS. Plan of care reviewed, she verbalized understanding of her plan of care and return to clinic. Education on iron infusion and the signs of reaction reviewed, she verbalized understanding of the signs and symptoms to report.       Intravenous Access:  Peripheral IV placed.    Treatment Conditions:  Not Applicable.      Post Infusion Assessment:  Patient tolerated infusion without incident.  Patient observed for 30 minutes post Iron infusion per protocol.  Site patent and intact, free from redness, edema or discomfort.  No evidence of extravasations.  Access discontinued per protocol.       Discharge Plan:   Discharge instructions reviewed with: Patient.  Patient and/or family verbalized understanding of discharge instructions and all questions answered.  Patient discharged in stable condition accompanied by: self.  Departure Mode: Ambulatory.      Vianey Magaña RN

## 2023-06-01 LAB
FERRITIN SERPL-MCNC: 51 NG/ML (ref 6–175)
GESTATIONAL GTT 1 HR POST DOSE: 122 MG/DL (ref 60–179)
GESTATIONAL GTT 2 HR POST DOSE: 110 MG/DL (ref 60–154)
GESTATIONAL GTT 3 HR POST DOSE: 85 MG/DL (ref 60–139)
GLUCOSE P FAST SERPL-MCNC: 73 MG/DL (ref 60–94)
IRON BINDING CAPACITY (ROCHE): 277 UG/DL (ref 240–430)
IRON SATN MFR SERPL: 23 % (ref 15–46)
IRON SERPL-MCNC: 63 UG/DL (ref 37–145)

## 2023-06-02 ENCOUNTER — INFUSION THERAPY VISIT (OUTPATIENT)
Dept: INFUSION THERAPY | Facility: HOSPITAL | Age: 33
End: 2023-06-02
Attending: ADVANCED PRACTICE MIDWIFE
Payer: MEDICAID

## 2023-06-02 VITALS
OXYGEN SATURATION: 98 % | HEART RATE: 65 BPM | SYSTOLIC BLOOD PRESSURE: 97 MMHG | TEMPERATURE: 98.4 F | RESPIRATION RATE: 16 BRPM | DIASTOLIC BLOOD PRESSURE: 59 MMHG

## 2023-06-02 DIAGNOSIS — O99.019 ANTEPARTUM ANEMIA: Primary | ICD-10-CM

## 2023-06-02 PROCEDURE — 258N000003 HC RX IP 258 OP 636: Performed by: ADVANCED PRACTICE MIDWIFE

## 2023-06-02 PROCEDURE — 96365 THER/PROPH/DIAG IV INF INIT: CPT

## 2023-06-02 PROCEDURE — 96366 THER/PROPH/DIAG IV INF ADDON: CPT

## 2023-06-02 PROCEDURE — 250N000011 HC RX IP 250 OP 636: Performed by: ADVANCED PRACTICE MIDWIFE

## 2023-06-02 RX ORDER — METHYLPREDNISOLONE SODIUM SUCCINATE 125 MG/2ML
125 INJECTION, POWDER, LYOPHILIZED, FOR SOLUTION INTRAMUSCULAR; INTRAVENOUS
Status: CANCELLED
Start: 2023-06-04

## 2023-06-02 RX ORDER — HEPARIN SODIUM (PORCINE) LOCK FLUSH IV SOLN 100 UNIT/ML 100 UNIT/ML
5 SOLUTION INTRAVENOUS
Status: CANCELLED | OUTPATIENT
Start: 2023-06-04

## 2023-06-02 RX ORDER — DIPHENHYDRAMINE HYDROCHLORIDE 50 MG/ML
50 INJECTION INTRAMUSCULAR; INTRAVENOUS
Status: CANCELLED
Start: 2023-06-04

## 2023-06-02 RX ORDER — HEPARIN SODIUM,PORCINE 10 UNIT/ML
5 VIAL (ML) INTRAVENOUS
Status: CANCELLED | OUTPATIENT
Start: 2023-06-04

## 2023-06-02 RX ORDER — ALBUTEROL SULFATE 0.83 MG/ML
2.5 SOLUTION RESPIRATORY (INHALATION)
Status: CANCELLED | OUTPATIENT
Start: 2023-06-04

## 2023-06-02 RX ORDER — EPINEPHRINE 1 MG/ML
0.3 INJECTION, SOLUTION INTRAMUSCULAR; SUBCUTANEOUS EVERY 5 MIN PRN
Status: CANCELLED | OUTPATIENT
Start: 2023-06-04

## 2023-06-02 RX ORDER — ALBUTEROL SULFATE 90 UG/1
1-2 AEROSOL, METERED RESPIRATORY (INHALATION)
Status: CANCELLED
Start: 2023-06-04

## 2023-06-02 RX ADMIN — IRON SUCROSE 300 MG: 20 INJECTION, SOLUTION INTRAVENOUS at 11:11

## 2023-06-02 RX ADMIN — SODIUM CHLORIDE 250 ML: 9 INJECTION, SOLUTION INTRAVENOUS at 11:09

## 2023-06-02 NOTE — PROGRESS NOTES
Infusion Nursing Note:  Muriel Chauhan presents today for #2/3 venofer 300 mg infusions.    Patient seen by provider today: No   present during visit today: Not Applicable.    Note: Muriel arrived ambulatory in stable condition.  She denies any side effects from her first iron sucrose infusion. Reports feeling well today.  Venofer infused as ordered. Muriel denies any side effects from today's iron infusion. She will return for her third dose of venofer on 6/5/23.      Intravenous Access:  Peripheral IV placed.    Treatment Conditions:  Not Applicable.      Post Infusion Assessment:  Patient tolerated infusion without incident.  Site patent and intact, free from redness, edema or discomfort.  Access discontinued per protocol.       Discharge Plan:   Patient discharged in stable condition accompanied by: self.  Departure Mode: Ambulatory.      Alexandria Kennedy RN

## 2023-06-05 ENCOUNTER — INFUSION THERAPY VISIT (OUTPATIENT)
Dept: INFUSION THERAPY | Facility: HOSPITAL | Age: 33
End: 2023-06-05
Attending: ADVANCED PRACTICE MIDWIFE
Payer: MEDICAID

## 2023-06-05 VITALS
SYSTOLIC BLOOD PRESSURE: 96 MMHG | OXYGEN SATURATION: 100 % | HEART RATE: 69 BPM | DIASTOLIC BLOOD PRESSURE: 59 MMHG | TEMPERATURE: 98 F | RESPIRATION RATE: 16 BRPM

## 2023-06-05 DIAGNOSIS — O99.019 ANTEPARTUM ANEMIA: Primary | ICD-10-CM

## 2023-06-05 PROCEDURE — 96365 THER/PROPH/DIAG IV INF INIT: CPT

## 2023-06-05 PROCEDURE — 96366 THER/PROPH/DIAG IV INF ADDON: CPT

## 2023-06-05 PROCEDURE — 258N000003 HC RX IP 258 OP 636: Performed by: ADVANCED PRACTICE MIDWIFE

## 2023-06-05 PROCEDURE — 250N000011 HC RX IP 250 OP 636: Performed by: ADVANCED PRACTICE MIDWIFE

## 2023-06-05 RX ORDER — HEPARIN SODIUM,PORCINE 10 UNIT/ML
5 VIAL (ML) INTRAVENOUS
Status: CANCELLED | OUTPATIENT
Start: 2023-06-06

## 2023-06-05 RX ORDER — DIPHENHYDRAMINE HYDROCHLORIDE 50 MG/ML
50 INJECTION INTRAMUSCULAR; INTRAVENOUS
Status: CANCELLED
Start: 2023-06-06

## 2023-06-05 RX ORDER — ALBUTEROL SULFATE 90 UG/1
1-2 AEROSOL, METERED RESPIRATORY (INHALATION)
Status: CANCELLED
Start: 2023-06-06

## 2023-06-05 RX ORDER — ALBUTEROL SULFATE 0.83 MG/ML
2.5 SOLUTION RESPIRATORY (INHALATION)
Status: CANCELLED | OUTPATIENT
Start: 2023-06-06

## 2023-06-05 RX ORDER — HEPARIN SODIUM (PORCINE) LOCK FLUSH IV SOLN 100 UNIT/ML 100 UNIT/ML
5 SOLUTION INTRAVENOUS
Status: CANCELLED | OUTPATIENT
Start: 2023-06-06

## 2023-06-05 RX ORDER — METHYLPREDNISOLONE SODIUM SUCCINATE 125 MG/2ML
125 INJECTION, POWDER, LYOPHILIZED, FOR SOLUTION INTRAMUSCULAR; INTRAVENOUS
Status: CANCELLED
Start: 2023-06-06

## 2023-06-05 RX ORDER — EPINEPHRINE 1 MG/ML
0.3 INJECTION, SOLUTION INTRAMUSCULAR; SUBCUTANEOUS EVERY 5 MIN PRN
Status: CANCELLED | OUTPATIENT
Start: 2023-06-06

## 2023-06-05 RX ADMIN — SODIUM CHLORIDE 250 ML: 9 INJECTION, SOLUTION INTRAVENOUS at 09:40

## 2023-06-05 RX ADMIN — IRON SUCROSE 300 MG: 20 INJECTION, SOLUTION INTRAVENOUS at 09:44

## 2023-06-05 NOTE — PROGRESS NOTES
Infusion Nursing Note:  Muriel Chauhan presents today for #3/3 venofer 300 mg infusions.   Patient seen by provider today: No   present during visit today: Not Applicable.    Note: Muriel arrived ambulatory for her last dose of Venofer.  She denies any side effects from her first two iron sucrose infusions. Venofer infused over 90 minutes followed by 250 ml normal saline per patient request.       Intravenous Access:  Peripheral IV placed.    Treatment Conditions:  Not Applicable.      Post Infusion Assessment:  Patient tolerated infusion without incident.  Site patent and intact, free from redness, edema or discomfort.  Access discontinued per protocol.       Discharge Plan:   Patient discharged in stable condition accompanied by: self.  Departure Mode: Ambulatory.      Alexandria Kennedy RN

## 2023-06-07 ENCOUNTER — TELEPHONE (OUTPATIENT)
Dept: MATERNAL FETAL MEDICINE | Facility: CLINIC | Age: 33
End: 2023-06-07
Payer: MEDICAID

## 2023-06-07 NOTE — TELEPHONE ENCOUNTER
Called patient to discuss moving upcoming MFM appointment from [place] location to [place] and adding a follow up OB visit. LVM, will await call back.

## 2023-06-08 ENCOUNTER — TELEPHONE (OUTPATIENT)
Dept: MATERNAL FETAL MEDICINE | Facility: CLINIC | Age: 33
End: 2023-06-08
Payer: MEDICAID

## 2023-06-08 NOTE — TELEPHONE ENCOUNTER
Spoke with patient about changing appointment on 6/13 to [place] and adding OB visit for follow up. Patient states she does not want more follow up and will be keeping her appointment for ultrasound only. Phone number given to call back with questions or if she changes her mind.

## 2023-06-13 ENCOUNTER — ANCILLARY PROCEDURE (OUTPATIENT)
Dept: ULTRASOUND IMAGING | Facility: HOSPITAL | Age: 33
End: 2023-06-13
Attending: OBSTETRICS & GYNECOLOGY
Payer: MEDICAID

## 2023-06-13 ENCOUNTER — OFFICE VISIT (OUTPATIENT)
Dept: MATERNAL FETAL MEDICINE | Facility: HOSPITAL | Age: 33
End: 2023-06-13
Attending: OBSTETRICS & GYNECOLOGY
Payer: MEDICAID

## 2023-06-13 DIAGNOSIS — N83.202 LEFT OVARIAN CYST: Primary | ICD-10-CM

## 2023-06-13 DIAGNOSIS — N83.202 LEFT OVARIAN CYST: ICD-10-CM

## 2023-06-13 PROCEDURE — G0463 HOSPITAL OUTPT CLINIC VISIT: HCPCS | Performed by: OBSTETRICS & GYNECOLOGY

## 2023-06-13 PROCEDURE — 99213 OFFICE O/P EST LOW 20 MIN: CPT | Mod: 25 | Performed by: OBSTETRICS & GYNECOLOGY

## 2023-06-13 PROCEDURE — 76816 OB US FOLLOW-UP PER FETUS: CPT | Mod: 26 | Performed by: OBSTETRICS & GYNECOLOGY

## 2023-06-13 PROCEDURE — 76816 OB US FOLLOW-UP PER FETUS: CPT

## 2023-06-13 NOTE — PROGRESS NOTES
Please see the imaging tab for details of the ultrasound performed today. Below is a summary of the discussion.    Ms. 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 She had an MRI of the chest and pelvis on 4/25/2023, which did not see any evidence of metastasis. She has had a consultation with Dr. Scott to review treatment options and surveillance prior to the MRI. However, Ms. Chauhan missed her follow up appointment with Oncology after the MRI, and they have not been able to reach her to arrange further follow up.     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 Ms. Chauhan shared that she is not planning to pursue additional treatment until after delivery. She is planning to visit her family in [place] for approximately one month, and stated that she will follow up with Oncology when she returns (she does not wish to make an appointment before she leaves on her trip).    Serial ultrasounds (every 4 weeks) are recommended to continue to monitor fetal growth. Follow up with Oncology to establish cancer surveillance plan during pregnancy, as well as treatment plan and timing of initiation of treatment.    Poppy Ingram MD  Specialist in Maternal-Fetal Medicine

## 2023-06-14 ENCOUNTER — PRENATAL OFFICE VISIT (OUTPATIENT)
Dept: MIDWIFE SERVICES | Facility: CLINIC | Age: 33
End: 2023-06-14
Payer: MEDICAID

## 2023-06-14 VITALS
DIASTOLIC BLOOD PRESSURE: 70 MMHG | WEIGHT: 197 LBS | HEART RATE: 85 BPM | BODY MASS INDEX: 33.8 KG/M2 | TEMPERATURE: 98.7 F | SYSTOLIC BLOOD PRESSURE: 109 MMHG

## 2023-06-14 DIAGNOSIS — Z34.03 ENCOUNTER FOR SUPERVISION OF NORMAL FIRST PREGNANCY IN THIRD TRIMESTER: Primary | ICD-10-CM

## 2023-06-14 PROCEDURE — 99212 OFFICE O/P EST SF 10 MIN: CPT | Mod: 24 | Performed by: ADVANCED PRACTICE MIDWIFE

## 2023-06-14 NOTE — PROGRESS NOTES
29w0d  Patient is feeling well. Positive fetal movement. Denies water leaking, vaginal bleeding, decreased fetal movement, contraction pain, or headaches.   Doing well and feeling good, feels more energy now than the beginning of the pregnancy. Excited for her trip home but nervous about getting sick. Her  is going to fly to [place] and then they will travel together. She just became and [place] Citizen today! She got her IV iron, will recheck her iron levels when she returns. She had an MFM growth, baby is growing bigger, AC 81% and overall 64%. Discussed going for walks after eating. Will watch what she is eating as well. Discussed tdap, will get that when she returns. No other questions or concerns today.   Danger signs reviewed, pre-eclampsia signs and symptoms discussed.   Knows when to call triage and has phone numbers.   Follow up after trip to [facility].   KRYSTLE Bashir CNM    Treatment Plan not completed within required time limits due to: cancelled appointment  on 12/10/2018

## 2023-07-19 ENCOUNTER — TELEPHONE (OUTPATIENT)
Dept: MATERNAL FETAL MEDICINE | Facility: CLINIC | Age: 33
End: 2023-07-19
Payer: MEDICAID

## 2023-07-19 NOTE — TELEPHONE ENCOUNTER
Writer attempted to call [employer] x3 to contact for prenatal care. Unable to leave a message. Lanny Adams RN

## 2023-07-24 ENCOUNTER — TELEPHONE (OUTPATIENT)
Dept: MIDWIFE SERVICES | Facility: CLINIC | Age: 33
End: 2023-07-24
Payer: MEDICAID

## 2023-07-24 NOTE — TELEPHONE ENCOUNTER
----- Message from KRYSTLE Ibarra CNM sent at 7/24/2023 10:28 AM CDT -----  Regarding: call to schedule  Can you give Muriel a call to schedule a prenatal visit? She is due for one, but has been traveling. Thank you! KOBI Selby

## 2023-11-27 ENCOUNTER — TELEPHONE (OUTPATIENT)
Dept: ONCOLOGY | Facility: CLINIC | Age: 33
End: 2023-11-27
Payer: MEDICAID

## 2023-11-28 NOTE — TELEPHONE ENCOUNTER
----- Message from Poppy Ingram MD sent at 6/13/2023 11:19 AM CDT -----  Sterling Guillaume,    I saw Muriel back for an ultrasound in our office today. I relayed to her that your team was trying to reach her and that she needed to schedule a follow up visit to establish plan of care following her MRI. She is planning to visit her family in [place] for one month and does not want to follow up until after she returns. I asked her to make an appointment now for when she returns, and she said that she will make the appointment when she returns.    Thanks,  Poppy

## 2023-12-23 ENCOUNTER — HEALTH MAINTENANCE LETTER (OUTPATIENT)
Age: 33
End: 2023-12-23

## 2024-05-06 ENCOUNTER — PATIENT OUTREACH (OUTPATIENT)
Dept: CARE COORDINATION | Facility: CLINIC | Age: 34
End: 2024-05-06
Payer: MEDICAID

## 2025-01-12 ENCOUNTER — HEALTH MAINTENANCE LETTER (OUTPATIENT)
Age: 35
End: 2025-01-12

## (undated) DEVICE — ESU PENCIL W/SMOKE EVAC NEPTUNE STRYKER 0703-046-000

## (undated) DEVICE — CONTAINER SPECIMEN 4OZ STERILE 17099

## (undated) DEVICE — SU VICRYL 2-0 TIE 54" J607H

## (undated) DEVICE — LINEN GOWN X4 5410

## (undated) DEVICE — PAD PERI INDIV WRAP 11" 2022A

## (undated) DEVICE — SU VICRYL 2-0 CT-2 27" J333H

## (undated) DEVICE — ESU GROUND PAD UNIVERSAL W/O CORD

## (undated) DEVICE — SOL NACL 0.9% IRRIG 1000ML BOTTLE 2F7124

## (undated) DEVICE — SU PLAIN 3-0 CT 27" 852H

## (undated) DEVICE — ADH SKIN CLOSURE PREMIERPRO EXOFIN 1.0ML 3470

## (undated) DEVICE — SU VICRYL 3-0 SH 27" J316H

## (undated) DEVICE — SU PDS II 0 CTX 36" Z370T

## (undated) DEVICE — SU PDS II 0 CTX 60" Z990G

## (undated) DEVICE — SU VICRYL 4-0 PS-2 18" UND J496H

## (undated) DEVICE — SU PROLENE 3-0 SHDA 36" 8522H

## (undated) DEVICE — SU MONOCRYL 4-0 PS-2 18" UND Y496G

## (undated) DEVICE — LINEN TOWEL PACK X5 5464

## (undated) DEVICE — WIPES FOLEY CARE SURESTEP PROVON DFC100

## (undated) DEVICE — PREP CHLORAPREP 26ML TINTED HI-LITE ORANGE 930815

## (undated) DEVICE — Device

## (undated) DEVICE — SU VICRYL 0 CT-2 27" J334H

## (undated) DEVICE — LINEN ORTHO PACK 5446

## (undated) DEVICE — SOL WATER IRRIG 1000ML BOTTLE 2F7114

## (undated) DEVICE — BARRIER SEPRAFILM 5X6" SINGLE SHEET 4301-02

## (undated) DEVICE — GOWN XLG DISP 9545

## (undated) DEVICE — COVER CAMERA IN-LIGHT DISP LT-C02

## (undated) DEVICE — SU VICRYL 0 CT-1 27" J340H

## (undated) DEVICE — SYR 10ML LL W/O NDL 302995

## (undated) DEVICE — GLOVE BIOGEL PI MICRO SZ 6.5 48565

## (undated) DEVICE — STRAP KNEE/BODY 31143004

## (undated) DEVICE — GLOVE BIOGEL PI MICRO INDICATOR UNDERGLOVE SZ 6.5 48965

## (undated) DEVICE — SYR BULB IRRIG DOVER 60 ML LATEX FREE 67000

## (undated) DEVICE — CATH TRAY FOLEY SURESTEP 16FR WDRAIN BAG STLK LATEX A300316A

## (undated) DEVICE — TUBING SMOKE EVAC 1CMX3M SEA3710

## (undated) DEVICE — GLOVE BIOGEL PI MICRO SZ 6.0 48560

## (undated) DEVICE — LIGHT HANDLE X2

## (undated) DEVICE — PAD CHUX UNDERPAD 30X36" P3036C

## (undated) DEVICE — SPONGE LAP 18X18" X8435

## (undated) DEVICE — SYR 50ML CATH TIP W/O NDL 309620

## (undated) DEVICE — GLOVE BIOGEL PI MICRO INDICATOR UNDERGLOVE SZ 7.0 48970

## (undated) DEVICE — SU MONOCRYL 3-0 PS-2 18" UND Y497G

## (undated) RX ORDER — ALBUTEROL SULFATE 0.83 MG/ML
SOLUTION RESPIRATORY (INHALATION)
Status: DISPENSED
Start: 2023-04-12

## (undated) RX ORDER — CALCIUM CHLORIDE 100 MG/ML
INJECTION INTRAVENOUS; INTRAVENTRICULAR
Status: DISPENSED
Start: 2023-04-12

## (undated) RX ORDER — PROPOFOL 10 MG/ML
INJECTION, EMULSION INTRAVENOUS
Status: DISPENSED
Start: 2023-04-12

## (undated) RX ORDER — GLYCOPYRROLATE 0.2 MG/ML
INJECTION INTRAMUSCULAR; INTRAVENOUS
Status: DISPENSED
Start: 2023-04-12

## (undated) RX ORDER — FENTANYL CITRATE 50 UG/ML
INJECTION, SOLUTION INTRAMUSCULAR; INTRAVENOUS
Status: DISPENSED
Start: 2023-04-12

## (undated) RX ORDER — ACETAMINOPHEN 325 MG/1
TABLET ORAL
Status: DISPENSED
Start: 2023-04-12

## (undated) RX ORDER — HYDROMORPHONE HYDROCHLORIDE 1 MG/ML
INJECTION, SOLUTION INTRAMUSCULAR; INTRAVENOUS; SUBCUTANEOUS
Status: DISPENSED
Start: 2023-04-12

## (undated) RX ORDER — CEFAZOLIN SODIUM/WATER 2 G/20 ML
SYRINGE (ML) INTRAVENOUS
Status: DISPENSED
Start: 2023-04-12

## (undated) RX ORDER — ALBUTEROL SULFATE 90 UG/1
AEROSOL, METERED RESPIRATORY (INHALATION)
Status: DISPENSED
Start: 2023-04-12

## (undated) RX ORDER — FENTANYL CITRATE-0.9 % NACL/PF 10 MCG/ML
PLASTIC BAG, INJECTION (ML) INTRAVENOUS
Status: DISPENSED
Start: 2023-04-12

## (undated) RX ORDER — KETOROLAC TROMETHAMINE 30 MG/ML
INJECTION, SOLUTION INTRAMUSCULAR; INTRAVENOUS
Status: DISPENSED
Start: 2023-04-12